# Patient Record
Sex: MALE | Race: WHITE | NOT HISPANIC OR LATINO
[De-identification: names, ages, dates, MRNs, and addresses within clinical notes are randomized per-mention and may not be internally consistent; named-entity substitution may affect disease eponyms.]

---

## 2017-01-05 ENCOUNTER — APPOINTMENT (OUTPATIENT)
Dept: PULMONOLOGY | Facility: CLINIC | Age: 52
End: 2017-01-05

## 2017-01-19 ENCOUNTER — RESULT REVIEW (OUTPATIENT)
Age: 52
End: 2017-01-19

## 2017-01-19 ENCOUNTER — FORM ENCOUNTER (OUTPATIENT)
Age: 52
End: 2017-01-19

## 2017-01-20 ENCOUNTER — OUTPATIENT (OUTPATIENT)
Dept: OUTPATIENT SERVICES | Facility: HOSPITAL | Age: 52
LOS: 1 days | Discharge: HOME | End: 2017-01-20
Payer: MEDICARE

## 2017-01-20 LAB
B PERT IGG+IGM PNL SER: SIGNIFICANT CHANGE UP
COLOR FLD: SIGNIFICANT CHANGE UP
FLUID INTAKE SUBSTANCE CLASS: SIGNIFICANT CHANGE UP
RCV VOL RI: 3560 /UL — HIGH (ref 0–5)
SPECIMEN SOURCE FLD: SIGNIFICANT CHANGE UP
TOTAL NUCLEATED CELL COUNT, BODY FLUID: 120 /UL — HIGH (ref 0–5)
TUBE TYPE: SIGNIFICANT CHANGE UP

## 2017-01-20 PROCEDURE — 31652 BRONCH EBUS SAMPLNG 1/2 NODE: CPT

## 2017-01-20 PROCEDURE — 31628 BRONCHOSCOPY/LUNG BX EACH: CPT

## 2017-01-20 PROCEDURE — 31629 BRONCHOSCOPY/NEEDLE BX EACH: CPT

## 2017-01-20 PROCEDURE — 31623 DX BRONCHOSCOPE/BRUSH: CPT

## 2017-01-20 PROCEDURE — 88305 TISSUE EXAM BY PATHOLOGIST: CPT

## 2017-01-20 PROCEDURE — 31654 BRONCH EBUS IVNTJ PERPH LES: CPT

## 2017-01-20 PROCEDURE — 31624 DX BRONCHOSCOPE/LAVAGE: CPT

## 2017-01-20 PROCEDURE — 87070 CULTURE OTHR SPECIMN AEROBIC: CPT

## 2017-01-20 PROCEDURE — 88104 CYTOPATH FL NONGYN SMEARS: CPT

## 2017-01-20 PROCEDURE — 87015 SPECIMEN INFECT AGNT CONCNTJ: CPT

## 2017-01-20 PROCEDURE — 87102 FUNGUS ISOLATION CULTURE: CPT

## 2017-01-20 PROCEDURE — 88173 CYTOPATH EVAL FNA REPORT: CPT

## 2017-01-20 PROCEDURE — 71010: CPT | Mod: 26

## 2017-01-20 PROCEDURE — 87206 SMEAR FLUORESCENT/ACID STAI: CPT

## 2017-01-20 PROCEDURE — 88341 IMHCHEM/IMCYTCHM EA ADD ANTB: CPT

## 2017-01-20 PROCEDURE — 89051 BODY FLUID CELL COUNT: CPT

## 2017-01-20 PROCEDURE — 71045 X-RAY EXAM CHEST 1 VIEW: CPT

## 2017-01-20 PROCEDURE — 88112 CYTOPATH CELL ENHANCE TECH: CPT

## 2017-01-20 PROCEDURE — 87116 MYCOBACTERIA CULTURE: CPT

## 2017-01-20 PROCEDURE — 87184 SC STD DISK METHOD PER PLATE: CPT

## 2017-01-21 LAB
FLUID SEGMENTED GRANULOCYTES: SIGNIFICANT CHANGE UP %
GRAM STN FLD: SIGNIFICANT CHANGE UP
NIGHT BLUE STAIN TISS: SIGNIFICANT CHANGE UP
SPECIMEN SOURCE: SIGNIFICANT CHANGE UP
SPECIMEN SOURCE: SIGNIFICANT CHANGE UP

## 2017-01-23 LAB
-  AMPICILLIN/SULBACTAM: SIGNIFICANT CHANGE UP
-  AMPICILLIN: SIGNIFICANT CHANGE UP
-  AZITHROMYCIN: SIGNIFICANT CHANGE UP
-  CEFTRIAXONE: SIGNIFICANT CHANGE UP
-  CHLORAMPHENICOL: SIGNIFICANT CHANGE UP
-  CIPROFLOXACIN: SIGNIFICANT CHANGE UP
-  CLARITHROMYCIN: SIGNIFICANT CHANGE UP
-  LEVOFLOXACIN: SIGNIFICANT CHANGE UP
-  TRIMETHOPRIM/SULFAMETHOXAZOLE: SIGNIFICANT CHANGE UP
CULTURE RESULTS: SIGNIFICANT CHANGE UP
METHOD TYPE: SIGNIFICANT CHANGE UP
ORGANISM # SPEC MICROSCOPIC CNT: SIGNIFICANT CHANGE UP
ORGANISM # SPEC MICROSCOPIC CNT: SIGNIFICANT CHANGE UP
SPECIMEN SOURCE: SIGNIFICANT CHANGE UP

## 2017-01-24 LAB
NON-GYNECOLOGICAL CYTOLOGY STUDY: SIGNIFICANT CHANGE UP
SURGICAL PATHOLOGY STUDY: SIGNIFICANT CHANGE UP

## 2017-01-26 DIAGNOSIS — R91.1 SOLITARY PULMONARY NODULE: ICD-10-CM

## 2017-01-30 PROBLEM — R93.3 GASTROINTESTINAL TRACT IMAGING ABNORMALITY: Status: ACTIVE | Noted: 2017-01-30

## 2017-01-31 ENCOUNTER — APPOINTMENT (OUTPATIENT)
Dept: THORACIC SURGERY | Facility: CLINIC | Age: 52
End: 2017-01-31

## 2017-01-31 DIAGNOSIS — F17.200 NICOTINE DEPENDENCE, UNSPECIFIED, UNCOMPLICATED: ICD-10-CM

## 2017-01-31 DIAGNOSIS — Z78.9 OTHER SPECIFIED HEALTH STATUS: ICD-10-CM

## 2017-01-31 DIAGNOSIS — Z80.1 FAMILY HISTORY OF MALIGNANT NEOPLASM OF TRACHEA, BRONCHUS AND LUNG: ICD-10-CM

## 2017-01-31 DIAGNOSIS — R93.3 ABNORMAL FINDINGS ON DIAGNOSTIC IMAGING OF OTHER PARTS OF DIGESTIVE TRACT: ICD-10-CM

## 2017-02-01 PROBLEM — Z78.9 SOCIAL ALCOHOL USE: Status: ACTIVE | Noted: 2017-02-01

## 2017-02-01 PROBLEM — F17.200 CURRENT EVERY DAY SMOKER: Status: ACTIVE | Noted: 2017-02-01

## 2017-02-01 PROBLEM — Z80.1 FAMILY HISTORY OF LUNG CANCER: Status: ACTIVE | Noted: 2017-02-01

## 2017-02-01 RX ORDER — GABAPENTIN 250 MG/5ML
250 SOLUTION ORAL
Refills: 0 | Status: COMPLETED | COMMUNITY
End: 2017-02-01

## 2017-02-01 RX ORDER — OXYCODONE HYDROCHLORIDE AND ACETAMINOPHEN 5; 325 MG/1; MG/1
5-325 TABLET ORAL
Refills: 0 | Status: ACTIVE | COMMUNITY

## 2017-02-16 ENCOUNTER — APPOINTMENT (OUTPATIENT)
Dept: PULMONOLOGY | Facility: CLINIC | Age: 52
End: 2017-02-16

## 2017-02-18 LAB
CULTURE RESULTS: SIGNIFICANT CHANGE UP
SPECIMEN SOURCE: SIGNIFICANT CHANGE UP

## 2017-02-21 ENCOUNTER — RESULT REVIEW (OUTPATIENT)
Age: 52
End: 2017-02-21

## 2017-02-21 VITALS
DIASTOLIC BLOOD PRESSURE: 99 MMHG | OXYGEN SATURATION: 94 % | HEIGHT: 70 IN | WEIGHT: 258.16 LBS | SYSTOLIC BLOOD PRESSURE: 115 MMHG | TEMPERATURE: 98 F | HEART RATE: 79 BPM | RESPIRATION RATE: 17 BRPM

## 2017-02-21 NOTE — PATIENT PROFILE ADULT. - PSH
H/O neck surgery    History of back surgery H/O neck surgery  cervical fusion  History of back surgery  lumbar fusion

## 2017-02-21 NOTE — PATIENT PROFILE ADULT. - PMH
COPD (chronic obstructive pulmonary disease)    Diabetes mellitus    Hypercholesterolemia    Lung nodule  RLL Chronic back pain    COPD (chronic obstructive pulmonary disease)    Diabetes mellitus    Hypercholesterolemia    Lung nodule  RLL

## 2017-02-22 ENCOUNTER — APPOINTMENT (OUTPATIENT)
Dept: THORACIC SURGERY | Facility: HOSPITAL | Age: 52
End: 2017-02-22

## 2017-02-22 ENCOUNTER — INPATIENT (INPATIENT)
Facility: HOSPITAL | Age: 52
LOS: 2 days | Discharge: HOME | DRG: 165 | End: 2017-02-25
Attending: SPECIALIST | Admitting: SPECIALIST
Payer: MEDICARE

## 2017-02-22 DIAGNOSIS — Z98.890 OTHER SPECIFIED POSTPROCEDURAL STATES: Chronic | ICD-10-CM

## 2017-02-22 LAB
ANION GAP SERPL CALC-SCNC: 8 MMOL/L — LOW (ref 9–16)
APTT BLD: 30.1 SEC — SIGNIFICANT CHANGE UP (ref 27.5–37.4)
BASE EXCESS BLDA CALC-SCNC: -1.4 MMOL/L — SIGNIFICANT CHANGE UP (ref -2–3)
BASE EXCESS BLDA CALC-SCNC: -2.6 MMOL/L — LOW (ref -2–3)
BUN SERPL-MCNC: 20 MG/DL — SIGNIFICANT CHANGE UP (ref 7–23)
CALCIUM SERPL-MCNC: 8.4 MG/DL — LOW (ref 8.5–10.5)
CHLORIDE SERPL-SCNC: 107 MMOL/L — SIGNIFICANT CHANGE UP (ref 96–108)
CK MB BLD-MCNC: 1.39 % — SIGNIFICANT CHANGE UP
CK MB CFR SERPL CALC: 12.8 NG/ML — HIGH (ref 0.5–3.6)
CK SERPL-CCNC: 923 U/L — HIGH (ref 39–308)
CO2 SERPL-SCNC: 23 MMOL/L — SIGNIFICANT CHANGE UP (ref 22–31)
CREAT SERPL-MCNC: 1.06 MG/DL — SIGNIFICANT CHANGE UP (ref 0.5–1.3)
EOSINOPHIL NFR BLD AUTO: 2 % — SIGNIFICANT CHANGE UP (ref 0–6)
GAS PNL BLDA: SIGNIFICANT CHANGE UP
GAS PNL BLDA: SIGNIFICANT CHANGE UP
GLUCOSE SERPL-MCNC: 168 MG/DL — HIGH (ref 70–99)
HBA1C BLD-MCNC: 6.1 % — HIGH (ref 4.8–5.6)
HCO3 BLDA-SCNC: 24 MMOL/L — SIGNIFICANT CHANGE UP (ref 21–28)
HCO3 BLDA-SCNC: 25 MMOL/L — SIGNIFICANT CHANGE UP (ref 21–28)
HCT VFR BLD CALC: 43.6 % — SIGNIFICANT CHANGE UP (ref 39–50)
HGB BLD-MCNC: 14.5 G/DL — SIGNIFICANT CHANGE UP (ref 13–17)
INR BLD: 0.95 — SIGNIFICANT CHANGE UP (ref 0.88–1.16)
LYMPHOCYTES # BLD AUTO: 7 % — LOW (ref 13–44)
MCHC RBC-ENTMCNC: 28.8 PG — SIGNIFICANT CHANGE UP (ref 27–34)
MCHC RBC-ENTMCNC: 33.3 G/DL — SIGNIFICANT CHANGE UP (ref 32–36)
MCV RBC AUTO: 86.5 FL — SIGNIFICANT CHANGE UP (ref 80–100)
MONOCYTES NFR BLD AUTO: 5 % — SIGNIFICANT CHANGE UP (ref 2–14)
NEUTROPHILS NFR BLD AUTO: 77 % — SIGNIFICANT CHANGE UP (ref 43–77)
PCO2 BLDA: 45 MMHG — SIGNIFICANT CHANGE UP (ref 35–48)
PCO2 BLDA: 48 MMHG — SIGNIFICANT CHANGE UP (ref 35–48)
PH BLDA: 7.33 — LOW (ref 7.35–7.45)
PH BLDA: 7.34 — LOW (ref 7.35–7.45)
PLATELET # BLD AUTO: 343 K/UL — SIGNIFICANT CHANGE UP (ref 150–400)
PO2 BLDA: 58 MMHG — CRITICAL LOW (ref 83–108)
PO2 BLDA: 68 MMHG — LOW (ref 83–108)
POTASSIUM SERPL-MCNC: 5 MMOL/L — SIGNIFICANT CHANGE UP (ref 3.5–5.3)
POTASSIUM SERPL-SCNC: 5 MMOL/L — SIGNIFICANT CHANGE UP (ref 3.5–5.3)
PROTHROM AB SERPL-ACNC: 10.5 SEC — SIGNIFICANT CHANGE UP (ref 10–13.1)
RBC # BLD: 5.04 M/UL — SIGNIFICANT CHANGE UP (ref 4.2–5.8)
RBC # FLD: 14.2 % — SIGNIFICANT CHANGE UP (ref 10.3–16.9)
SAO2 % BLDA: 90 % — LOW (ref 95–100)
SAO2 % BLDA: 92 % — LOW (ref 95–100)
SODIUM SERPL-SCNC: 138 MMOL/L — SIGNIFICANT CHANGE UP (ref 135–145)
TROPONIN I SERPL-MCNC: <0.015 NG/ML — SIGNIFICANT CHANGE UP (ref 0.01–0.04)
WBC # BLD: 21.1 K/UL — HIGH (ref 3.8–10.5)
WBC # FLD AUTO: 21.1 K/UL — HIGH (ref 3.8–10.5)

## 2017-02-22 PROCEDURE — 93010 ELECTROCARDIOGRAM REPORT: CPT

## 2017-02-22 PROCEDURE — 71010: CPT | Mod: 26

## 2017-02-22 RX ORDER — DEXTROSE 50 % IN WATER 50 %
25 SYRINGE (ML) INTRAVENOUS ONCE
Qty: 0 | Refills: 0 | Status: DISCONTINUED | OUTPATIENT
Start: 2017-02-22 | End: 2017-02-25

## 2017-02-22 RX ORDER — HYDROMORPHONE HYDROCHLORIDE 2 MG/ML
1.5 INJECTION INTRAMUSCULAR; INTRAVENOUS; SUBCUTANEOUS
Qty: 0 | Refills: 0 | Status: DISCONTINUED | OUTPATIENT
Start: 2017-02-22 | End: 2017-02-23

## 2017-02-22 RX ORDER — SODIUM CHLORIDE 9 MG/ML
1000 INJECTION, SOLUTION INTRAVENOUS
Qty: 0 | Refills: 0 | Status: DISCONTINUED | OUTPATIENT
Start: 2017-02-22 | End: 2017-02-25

## 2017-02-22 RX ORDER — BUPIVACAINE 13.3 MG/ML
20 INJECTION, SUSPENSION, LIPOSOMAL INFILTRATION ONCE
Qty: 0 | Refills: 0 | Status: DISCONTINUED | OUTPATIENT
Start: 2017-02-22 | End: 2017-02-24

## 2017-02-22 RX ORDER — SODIUM CHLORIDE 9 MG/ML
1000 INJECTION, SOLUTION INTRAVENOUS
Qty: 0 | Refills: 0 | Status: DISCONTINUED | OUTPATIENT
Start: 2017-02-22 | End: 2017-02-23

## 2017-02-22 RX ORDER — GLUCAGON INJECTION, SOLUTION 0.5 MG/.1ML
1 INJECTION, SOLUTION SUBCUTANEOUS ONCE
Qty: 0 | Refills: 0 | Status: DISCONTINUED | OUTPATIENT
Start: 2017-02-22 | End: 2017-02-25

## 2017-02-22 RX ORDER — FAMOTIDINE 10 MG/ML
20 INJECTION INTRAVENOUS EVERY 12 HOURS
Qty: 0 | Refills: 0 | Status: DISCONTINUED | OUTPATIENT
Start: 2017-02-22 | End: 2017-02-25

## 2017-02-22 RX ORDER — NICOTINE POLACRILEX 2 MG
1 GUM BUCCAL DAILY
Qty: 0 | Refills: 0 | Status: DISCONTINUED | OUTPATIENT
Start: 2017-02-22 | End: 2017-02-25

## 2017-02-22 RX ORDER — BUPROPION HYDROCHLORIDE 150 MG/1
150 TABLET, EXTENDED RELEASE ORAL DAILY
Qty: 0 | Refills: 0 | Status: DISCONTINUED | OUTPATIENT
Start: 2017-02-22 | End: 2017-02-25

## 2017-02-22 RX ORDER — NALOXONE HYDROCHLORIDE 4 MG/.1ML
0.1 SPRAY NASAL
Qty: 0 | Refills: 0 | Status: DISCONTINUED | OUTPATIENT
Start: 2017-02-22 | End: 2017-02-24

## 2017-02-22 RX ORDER — KETOROLAC TROMETHAMINE 30 MG/ML
30 SYRINGE (ML) INJECTION EVERY 6 HOURS
Qty: 0 | Refills: 0 | Status: DISCONTINUED | OUTPATIENT
Start: 2017-02-22 | End: 2017-02-25

## 2017-02-22 RX ORDER — DOCUSATE SODIUM 100 MG
100 CAPSULE ORAL THREE TIMES A DAY
Qty: 0 | Refills: 0 | Status: DISCONTINUED | OUTPATIENT
Start: 2017-02-22 | End: 2017-02-25

## 2017-02-22 RX ORDER — ONDANSETRON 8 MG/1
4 TABLET, FILM COATED ORAL EVERY 6 HOURS
Qty: 0 | Refills: 0 | Status: DISCONTINUED | OUTPATIENT
Start: 2017-02-22 | End: 2017-02-24

## 2017-02-22 RX ORDER — DEXTROSE 50 % IN WATER 50 %
12.5 SYRINGE (ML) INTRAVENOUS ONCE
Qty: 0 | Refills: 0 | Status: DISCONTINUED | OUTPATIENT
Start: 2017-02-22 | End: 2017-02-25

## 2017-02-22 RX ORDER — METHADONE HYDROCHLORIDE 40 MG/1
10 TABLET ORAL DAILY
Qty: 0 | Refills: 0 | Status: DISCONTINUED | OUTPATIENT
Start: 2017-02-22 | End: 2017-02-25

## 2017-02-22 RX ORDER — BUPROPION HYDROCHLORIDE 150 MG/1
150 TABLET, EXTENDED RELEASE ORAL DAILY
Qty: 0 | Refills: 0 | Status: DISCONTINUED | OUTPATIENT
Start: 2017-02-22 | End: 2017-02-22

## 2017-02-22 RX ORDER — GABAPENTIN 400 MG/1
300 CAPSULE ORAL
Qty: 0 | Refills: 0 | Status: DISCONTINUED | OUTPATIENT
Start: 2017-02-22 | End: 2017-02-25

## 2017-02-22 RX ORDER — METFORMIN HYDROCHLORIDE 850 MG/1
0 TABLET ORAL
Qty: 0 | Refills: 0 | COMMUNITY

## 2017-02-22 RX ORDER — ACETAMINOPHEN 500 MG
1000 TABLET ORAL ONCE
Qty: 0 | Refills: 0 | Status: DISCONTINUED | OUTPATIENT
Start: 2017-02-22 | End: 2017-02-25

## 2017-02-22 RX ORDER — INSULIN LISPRO 100/ML
VIAL (ML) SUBCUTANEOUS
Qty: 0 | Refills: 0 | Status: DISCONTINUED | OUTPATIENT
Start: 2017-02-22 | End: 2017-02-25

## 2017-02-22 RX ORDER — VANCOMYCIN HCL 1 G
1000 VIAL (EA) INTRAVENOUS EVERY 12 HOURS
Qty: 0 | Refills: 0 | Status: DISCONTINUED | OUTPATIENT
Start: 2017-02-22 | End: 2017-02-22

## 2017-02-22 RX ORDER — DEXTROSE 50 % IN WATER 50 %
1 SYRINGE (ML) INTRAVENOUS ONCE
Qty: 0 | Refills: 0 | Status: DISCONTINUED | OUTPATIENT
Start: 2017-02-22 | End: 2017-02-25

## 2017-02-22 RX ORDER — HEPARIN SODIUM 5000 [USP'U]/ML
7500 INJECTION INTRAVENOUS; SUBCUTANEOUS EVERY 8 HOURS
Qty: 0 | Refills: 0 | Status: DISCONTINUED | OUTPATIENT
Start: 2017-02-22 | End: 2017-02-25

## 2017-02-22 RX ORDER — CEFAZOLIN SODIUM 1 G
1000 VIAL (EA) INJECTION EVERY 8 HOURS
Qty: 0 | Refills: 0 | Status: COMPLETED | OUTPATIENT
Start: 2017-02-22 | End: 2017-02-24

## 2017-02-22 RX ORDER — HYDROMORPHONE HYDROCHLORIDE 2 MG/ML
30 INJECTION INTRAMUSCULAR; INTRAVENOUS; SUBCUTANEOUS
Qty: 0 | Refills: 0 | Status: DISCONTINUED | OUTPATIENT
Start: 2017-02-22 | End: 2017-02-23

## 2017-02-22 RX ORDER — HEPARIN SODIUM 5000 [USP'U]/ML
5000 INJECTION INTRAVENOUS; SUBCUTANEOUS EVERY 8 HOURS
Qty: 0 | Refills: 0 | Status: DISCONTINUED | OUTPATIENT
Start: 2017-02-22 | End: 2017-02-22

## 2017-02-22 RX ADMIN — Medication 1 PATCH: at 23:06

## 2017-02-22 RX ADMIN — ONDANSETRON 4 MILLIGRAM(S): 8 TABLET, FILM COATED ORAL at 22:51

## 2017-02-22 RX ADMIN — HEPARIN SODIUM 7500 UNIT(S): 5000 INJECTION INTRAVENOUS; SUBCUTANEOUS at 15:57

## 2017-02-22 RX ADMIN — GABAPENTIN 300 MILLIGRAM(S): 400 CAPSULE ORAL at 22:26

## 2017-02-22 RX ADMIN — Medication 100 MILLIGRAM(S): at 15:57

## 2017-02-22 RX ADMIN — Medication 100 MILLIGRAM(S): at 22:26

## 2017-02-22 RX ADMIN — FAMOTIDINE 20 MILLIGRAM(S): 10 INJECTION INTRAVENOUS at 22:26

## 2017-02-22 RX ADMIN — HEPARIN SODIUM 7500 UNIT(S): 5000 INJECTION INTRAVENOUS; SUBCUTANEOUS at 22:26

## 2017-02-22 RX ADMIN — HYDROMORPHONE HYDROCHLORIDE 30 MILLILITER(S): 2 INJECTION INTRAMUSCULAR; INTRAVENOUS; SUBCUTANEOUS at 20:11

## 2017-02-22 RX ADMIN — HYDROMORPHONE HYDROCHLORIDE 30 MILLILITER(S): 2 INJECTION INTRAMUSCULAR; INTRAVENOUS; SUBCUTANEOUS at 14:35
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
## 2017-02-22 NOTE — PROVIDER CONTACT NOTE (OTHER) - SITUATION
pt c/o non-radiating chest pain to right side of chest, PA page and made aware, pt denies any difficulty breathing or SOB

## 2017-02-22 NOTE — H&P ADULT. - PMH
Chronic back pain    COPD (chronic obstructive pulmonary disease)    Diabetes mellitus    Hypercholesterolemia    Lung nodule  RLL

## 2017-02-22 NOTE — CONSULT NOTE ADULT - SUBJECTIVE AND OBJECTIVE BOX
Pain Management Consult Note - [facility] [phone]    Chief Complaint:  Chest pain    HPI:  50 y/o male seen in PACU S/P VATS RLL lobectomy.  Pt. appears comfortable now.  Per RN pt. was given haldol as he was combative after coming out of the OR.  Pt. has chronic pain in his back and sees Dr. Aguilar for pain management.  He takes methadone 10 mg daily (states he stopped it 3 days ago due to nausea), percocet 10/325 6 tabs/day, and gabapentin 300 mg BID.  Pt. complains of pain in the chest.      Pain is ___ sharp ____dull ___burning ___achy ___ Intensity: ____ mild ___mod ___severe     Location __x__surgical site ____cervical _____lumbar ____abd ____upper ext____lower ext    Worse with ____activity _x___movement _____physical therapy___ Rest    Improved with ____medication __x__rest ____physical therapy    ROS: Const:  _-__febrile   Eyes:___ENT:___CV: _+__chest pain  Resp: __-__sob  GI:_-__nausea _-__vomiting _-__abd pain ___npo ___clears _+_full diet __bm  :___ Musk: _+__pain ___spasm  Skin:_+__ Neuro:  __-_nzriolnn__-_ushuzzqqi_+ (left leg-chronic)__ numbness __-__weakness ___paresth  Psych:__anxiety  Endo:_+__ Heme:__-_Allergy:__PCN_______, ___all others reviewed and negative    PAST MEDICAL & SURGICAL HISTORY:  Chronic back pain  Hypercholesterolemia  Diabetes mellitus  COPD (chronic obstructive pulmonary disease)  Lung nodule: RLL  H/O neck surgery: cervical fusion  History of back surgery: lumbar fusion      SH: _<1/2 ppd__Tobacco   _unknown__Alcohol                          FH:FAMILY HISTORY:  None per patient      lactated ringers. 1000milliLiter(s) IV Continuous <Continuous>  ketorolac   Injectable 30milliGRAM(s) IV Push every 6 hours PRN  famotidine    Tablet 20milliGRAM(s) Oral every 12 hours  docusate sodium 100milliGRAM(s) Oral three times a day  insulin lispro (HumaLOG) corrective regimen sliding scale  SubCutaneous Before meals and at bedtime  dextrose 5%. 1000milliLiter(s) IV Continuous <Continuous>  dextrose Gel 1Dose(s) Oral once PRN  dextrose 50% Injectable 12.5Gram(s) IV Push once  dextrose 50% Injectable 25Gram(s) IV Push once  dextrose 50% Injectable 25Gram(s) IV Push once  glucagon  Injectable 1milliGRAM(s) IntraMuscular once PRN  BUpivacaine liposome 1.3% Injectable 20milliLiter(s) Local Injection once  heparin  Injectable 7500Unit(s) SubCutaneous every 8 hours  ceFAZolin   IVPB 1000milliGRAM(s) IV Intermittent every 8 hours  acetaminophen  IVPB. 1000milliGRAM(s) IV Intermittent once  nicotine -  14 mG/24Hr(s) Patch 1patch Transdermal daily  gabapentin 300milliGRAM(s) Oral two times a day  buPROPion XL . 150milliGRAM(s) Oral daily  HYDROmorphone PCA (1 mG/mL) 30milliLiter(s) PCA Continuous PCA Continuous  HYDROmorphone PCA (1 mG/mL) Rescue Clinician Bolus 1.5milliGRAM(s) IV Push every 2 hours PRN  naloxone Injectable 0.1milliGRAM(s) IV Push every 3 minutes PRN  ondansetron Injectable 4milliGRAM(s) IV Push every 6 hours PRN      T(C): 36.3, Max: 36.7 (02-21 @ 16:33)  HR: 93 (79 - 108)  BP: 124/71 (113/78 - 140/90)  RR: 16 (12 - 30)  SpO2: 94% (91% - 98%)  Wt(kg): --    T(C): 36.3, Max: 36.7 (02-21 @ 16:33)  HR: 93 (79 - 108)  BP: 124/71 (113/78 - 140/90)  RR: 16 (12 - 30)  SpO2: 94% (91% - 98%)  Wt(kg): --    T(C): 36.3, Max: 36.7 (02-21 @ 16:33)  HR: 93 (79 - 108)  BP: 124/71 (113/78 - 140/90)  RR: 16 (12 - 30)  SpO2: 94% (91% - 98%)  Wt(kg): --    PHYSICAL EXAM:  Gen Appearance: _x__no acute distress _x__appropriate        Neuro: __x_SILT feet__x__ EOM Intact Psych: AAOX_3_, __x_mood/affect appropriate        Eyes: __x_conjunctiva WNL  _x____ Pupils equal and round        ENT: _x__ears and nose atraumatic_x__ Hearing grossly intact        Neck: ___trachea midline, no visible masses ___thyroid without palpable mass    Resp: __x_Nml WOB____No tactile fremitus ___clear to auscultation    Cardio: ___extremities free from edema ____pedal pulses palpable    GI/Abdomen: _x__soft ___x__ Nontender______Nondistended_____HSM    Lymphatic: ___no palpable nodes in neck  ___no palpable nodes calves and feet    Skin/Wound: ___Incision, _x__Dressing c/d/i (over right side of chest),   ____surrounding tissues soft,  __x_drain/chest tube present____    Muscular: EHL _5__/5  Gastrocnemius___/5    _x__absent clubbing/cyanosis      ASSESSMENT: This is a 51y old Male with a history of   NODULE R91.1: NODULE R91.1  Chronic back pain  Hypercholesterolemia  Diabetes mellitus  COPD (chronic obstructive pulmonary disease)  Lung nodule: RLL  Right lower lobe lung mass  Robot-assisted lobectomy of right lung with video-assisted thoracoscopic surgery (VATS)  H/O neck surgery: cervical fusion  History of back surgery: lumbar fusion  opioid dependence and right sided chest pain S/P VATS, RLL lobectomy and is doing well post op.    Recommended Treatment PLAN:  1.  Methadone 10 mg qd  2.  Dilaudid PCA 0/0.4mg/6min with 1.5 mg IV q2h prn  3.  Toradol 30 mg IV q6h x6 doses prn as per primary team
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
## 2017-02-22 NOTE — H&P ADULT. - ASSESSMENT
52 y/o M with non-small cell CA of right lower lobe of lung  -consented for bronchoscopy with R VATS RA RLLx  -type and screen x 2  -2u PRBC on hold for OR

## 2017-02-22 NOTE — CHART NOTE - NSCHARTNOTEFT_GEN_A_CORE
Called from PACU that patient was having chest pain/pressure. Went and evaluated patient in PACU, patient complaining of right sided chest pain that is pressure in nature. Worse when he moves, coughs or takes a deep breath. Denies any radiation of the pain, shortness of breath, diaphoresis, nausea or vomiting. Patient states pain is 8/10. Patient on Dilaudid PCA, received 0.4mg via PCA and pain subsided to 4/10. EKG done in PACU, no obvious changes compared to preop EKG. Cardiac enzymes drawn, troponin negative. Will recheck again in 6 hours (10pm). Patient has right sided pleural chest tube in place, on suction with no air leak. Patient reassured that is was most likely post operative/ chest tube pain, pain fully resolved.

## 2017-02-22 NOTE — BRIEF OPERATIVE NOTE - PROCEDURE
Robot-assisted lobectomy of right lung with video-assisted thoracoscopic surgery (VATS)  02/22/2017    Active  DGLASSER

## 2017-02-23 LAB
ANION GAP SERPL CALC-SCNC: 9 MMOL/L — SIGNIFICANT CHANGE UP (ref 9–16)
BUN SERPL-MCNC: 13 MG/DL — SIGNIFICANT CHANGE UP (ref 7–23)
CALCIUM SERPL-MCNC: 8.6 MG/DL — SIGNIFICANT CHANGE UP (ref 8.5–10.5)
CHLORIDE SERPL-SCNC: 101 MMOL/L — SIGNIFICANT CHANGE UP (ref 96–108)
CK MB BLD-MCNC: 1.22 % — SIGNIFICANT CHANGE UP
CK MB CFR SERPL CALC: 10.9 NG/ML — HIGH (ref 0.5–3.6)
CK SERPL-CCNC: 897 U/L — HIGH (ref 39–308)
CO2 SERPL-SCNC: 27 MMOL/L — SIGNIFICANT CHANGE UP (ref 22–31)
CREAT SERPL-MCNC: 0.68 MG/DL — SIGNIFICANT CHANGE UP (ref 0.5–1.3)
GLUCOSE SERPL-MCNC: 123 MG/DL — HIGH (ref 70–99)
HCT VFR BLD CALC: 39.5 % — SIGNIFICANT CHANGE UP (ref 39–50)
HGB BLD-MCNC: 12.9 G/DL — LOW (ref 13–17)
MAGNESIUM SERPL-MCNC: 2.1 MG/DL — SIGNIFICANT CHANGE UP (ref 1.6–2.4)
MCHC RBC-ENTMCNC: 28.6 PG — SIGNIFICANT CHANGE UP (ref 27–34)
MCHC RBC-ENTMCNC: 32.7 G/DL — SIGNIFICANT CHANGE UP (ref 32–36)
MCV RBC AUTO: 87.6 FL — SIGNIFICANT CHANGE UP (ref 80–100)
PHOSPHATE SERPL-MCNC: 3 MG/DL — SIGNIFICANT CHANGE UP (ref 2.5–4.5)
PLATELET # BLD AUTO: 311 K/UL — SIGNIFICANT CHANGE UP (ref 150–400)
POTASSIUM SERPL-MCNC: 3.9 MMOL/L — SIGNIFICANT CHANGE UP (ref 3.5–5.3)
POTASSIUM SERPL-SCNC: 3.9 MMOL/L — SIGNIFICANT CHANGE UP (ref 3.5–5.3)
RBC # BLD: 4.51 M/UL — SIGNIFICANT CHANGE UP (ref 4.2–5.8)
RBC # FLD: 14.1 % — SIGNIFICANT CHANGE UP (ref 10.3–16.9)
SODIUM SERPL-SCNC: 137 MMOL/L — SIGNIFICANT CHANGE UP (ref 135–145)
TROPONIN I SERPL-MCNC: <0.015 NG/ML — SIGNIFICANT CHANGE UP (ref 0.01–0.04)
WBC # BLD: 16.8 K/UL — HIGH (ref 3.8–10.5)
WBC # FLD AUTO: 16.8 K/UL — HIGH (ref 3.8–10.5)

## 2017-02-23 PROCEDURE — 71010: CPT | Mod: 26,77

## 2017-02-23 PROCEDURE — 71010: CPT | Mod: 26

## 2017-02-23 RX ADMIN — Medication 1 PATCH: at 13:49

## 2017-02-23 RX ADMIN — METHADONE HYDROCHLORIDE 10 MILLIGRAM(S): 40 TABLET ORAL at 13:37

## 2017-02-23 RX ADMIN — Medication 30 MILLIGRAM(S): at 07:14

## 2017-02-23 RX ADMIN — GABAPENTIN 300 MILLIGRAM(S): 400 CAPSULE ORAL at 06:17

## 2017-02-23 RX ADMIN — Medication 100 MILLIGRAM(S): at 07:41

## 2017-02-23 RX ADMIN — Medication 30 MILLIGRAM(S): at 05:50

## 2017-02-23 RX ADMIN — FAMOTIDINE 20 MILLIGRAM(S): 10 INJECTION INTRAVENOUS at 17:06

## 2017-02-23 RX ADMIN — Medication 100 MILLIGRAM(S): at 06:17

## 2017-02-23 RX ADMIN — HEPARIN SODIUM 7500 UNIT(S): 5000 INJECTION INTRAVENOUS; SUBCUTANEOUS at 22:04

## 2017-02-23 RX ADMIN — HEPARIN SODIUM 7500 UNIT(S): 5000 INJECTION INTRAVENOUS; SUBCUTANEOUS at 06:16

## 2017-02-23 RX ADMIN — Medication 100 MILLIGRAM(S): at 22:04

## 2017-02-23 RX ADMIN — Medication 100 MILLIGRAM(S): at 13:37

## 2017-02-23 RX ADMIN — Medication 100 MILLIGRAM(S): at 17:05

## 2017-02-23 RX ADMIN — FAMOTIDINE 20 MILLIGRAM(S): 10 INJECTION INTRAVENOUS at 06:17

## 2017-02-23 RX ADMIN — BUPROPION HYDROCHLORIDE 150 MILLIGRAM(S): 150 TABLET, EXTENDED RELEASE ORAL at 13:37

## 2017-02-23 RX ADMIN — Medication 100 MILLIGRAM(S): at 00:35

## 2017-02-23 RX ADMIN — GABAPENTIN 300 MILLIGRAM(S): 400 CAPSULE ORAL at 17:06

## 2017-02-23 RX ADMIN — HEPARIN SODIUM 7500 UNIT(S): 5000 INJECTION INTRAVENOUS; SUBCUTANEOUS at 14:00

## 2017-02-23 RX ADMIN — HYDROMORPHONE HYDROCHLORIDE 30 MILLILITER(S): 2 INJECTION INTRAMUSCULAR; INTRAVENOUS; SUBCUTANEOUS at 09:17

## 2017-02-23 RX ADMIN — Medication 1 PATCH: at 23:45

## 2017-02-23 NOTE — CHART NOTE - NSCHARTNOTEFT_GEN_A_CORE
Chest Tube Removal:    Patient states that he has severe pain where the chest tube is. Tube checked for airleak. There was no air leak. Chest Xray reviewed without evidence of pneumo or airspace. Chest tube site was cleaned thoroughly. Instructions for chest tube removal gone over thoroughly and practiced multiple times. Tube was removed with occulsive dressing in place and tie down was tied. Patient reported feeling better after chest tube removal. Chest xray was reviewed without evidence of airspace. Subcutaneous air stable..      Alice Shah Pa-C

## 2017-02-23 NOTE — PROGRESS NOTE ADULT - SUBJECTIVE AND OBJECTIVE BOX
Patient discussed on morning rounds with Dr. Roberts     Operation / Date:   2/22/17 - R VATS RA RLLx with lymph node dissection    SUBJECTIVE ASSESSMENT:  51y Male s/p chest tube removal. States that his pain has only improved slightly since the chest tube was removed. States that he is still pressing his PCA every few minutes. Denies SOB, left sided chest pain, n/v/d, abdominal pain, leg swelling or leg tenderness, dizziness or lightheadedness.         Vital Signs Last 24 Hrs  T(C): 36.9, Max: 37.3 (02-22 @ 21:15)  T(F): 98.4, Max: 99.1 (02-22 @ 21:15)  HR: 84 (82 - 105)  BP: 109/75 (109/75 - 144/87)  BP(mean): 87 (87 - 124)  RR: 16 (12 - 20)  SpO2: 97% (90% - 99%)  I&O's Detail    I & Os for current day (as of 23 Feb 2017 12:06)  =============================================  IN:    lactated ringers.: 900 ml    Oral Fluid: 100 ml    Total IN: 1000 ml  ---------------------------------------------  OUT:    Voided: 600 ml    Chest Tube: 31 ml    Total OUT: 631 ml  ---------------------------------------------  Total NET: 369 ml      CHEST TUBE:  No.  MELISSA DRAIN:  No.  EPICARDIAL WIRES: No.  TIE DOWNS: Yes.  MOORE: No.    PHYSICAL EXAM:    General: AOx3 in distress due to pain     Neurological: No focal neuro deficit. Distal sensation intact.     Cardiovascular:    Respiratory:    Gastrointestinal:    Extremities:    Vascular:    Incision Sites:    LABS:                        12.9   16.8  )-----------( 311      ( 23 Feb 2017 10:45 )             39.5       COUMADIN:  Yes/No. REASON: .    PT/INR - ( 22 Feb 2017 14:03 )   PT: 10.5 sec;   INR: 0.95          PTT - ( 22 Feb 2017 14:03 )  PTT:30.1 sec    23 Feb 2017 04:49    137    |  101    |  13     ----------------------------<  123    3.9     |  27     |  0.68     Ca    8.6        23 Feb 2017 04:49  Phos  3.0       23 Feb 2017 04:49  Mg     2.1       23 Feb 2017 04:49            MEDICATIONS  (STANDING):  lactated ringers. 1000milliLiter(s) IV Continuous <Continuous>  famotidine    Tablet 20milliGRAM(s) Oral every 12 hours  docusate sodium 100milliGRAM(s) Oral three times a day  insulin lispro (HumaLOG) corrective regimen sliding scale  SubCutaneous Before meals and at bedtime  dextrose 5%. 1000milliLiter(s) IV Continuous <Continuous>  dextrose 50% Injectable 12.5Gram(s) IV Push once  dextrose 50% Injectable 25Gram(s) IV Push once  dextrose 50% Injectable 25Gram(s) IV Push once  BUpivacaine liposome 1.3% Injectable 20milliLiter(s) Local Injection once  heparin  Injectable 7500Unit(s) SubCutaneous every 8 hours  ceFAZolin   IVPB 1000milliGRAM(s) IV Intermittent every 8 hours  acetaminophen  IVPB. 1000milliGRAM(s) IV Intermittent once  nicotine -  14 mG/24Hr(s) Patch 1patch Transdermal daily  gabapentin 300milliGRAM(s) Oral two times a day  buPROPion XL . 150milliGRAM(s) Oral daily  HYDROmorphone PCA (1 mG/mL) 30milliLiter(s) PCA Continuous PCA Continuous  methadone 10milliGRAM(s) Oral daily    MEDICATIONS  (PRN):  ketorolac   Injectable 30milliGRAM(s) IV Push every 6 hours PRN Moderate Pain  dextrose Gel 1Dose(s) Oral once PRN Blood Glucose LESS THAN 70 milliGRAM(s)/deciliter  glucagon  Injectable 1milliGRAM(s) IntraMuscular once PRN Glucose LESS THAN 70 milligrams/deciliter  HYDROmorphone PCA (1 mG/mL) Rescue Clinician Bolus 1.5milliGRAM(s) IV Push every 2 hours PRN for Pain Scale GREATER THAN 6  naloxone Injectable 0.1milliGRAM(s) IV Push every 3 minutes PRN For ANY of the following changes in patient status:  A. RR LESS THAN 10 breaths per minute, B. Oxygen saturation LESS THAN 90%, C. Sedation score of 6  ondansetron Injectable 4milliGRAM(s) IV Push every 6 hours PRN Nausea        RADIOLOGY & ADDITIONAL TESTS: Patient discussed on morning rounds with Dr. Roberts     Operation / Date:   2/22/17 - R VATS RA RLLx with lymph node dissection    SUBJECTIVE ASSESSMENT:  51y Male s/p chest tube removal. States that his pain has only improved slightly since the chest tube was removed. States that he is still pressing his PCA every few minutes. Denies SOB, left sided chest pain, n/v/d, abdominal pain, leg swelling or leg tenderness, dizziness or lightheadedness.         Vital Signs Last 24 Hrs  T(C): 36.9, Max: 37.3 (02-22 @ 21:15)  T(F): 98.4, Max: 99.1 (02-22 @ 21:15)  HR: 84 (82 - 105)  BP: 109/75 (109/75 - 144/87)  BP(mean): 87 (87 - 124)  RR: 16 (12 - 20)  SpO2: 97% (90% - 99%)  I&O's Detail    I & Os for current day (as of 23 Feb 2017 12:06)  =============================================  IN:    lactated ringers.: 900 ml    Oral Fluid: 100 ml    Total IN: 1000 ml  ---------------------------------------------  OUT:    Voided: 600 ml    Chest Tube: 31 ml    Total OUT: 631 ml  ---------------------------------------------  Total NET: 369 ml      CHEST TUBE:  No.  MELSISA DRAIN:  No.  EPICARDIAL WIRES: No.  TIE DOWNS: Yes.  MOORE: No.    PHYSICAL EXAM:    General: AOx3 in distress due to pain. Answers questions appropriately.     Neurological: No focal neuro deficit. Distal sensation intact. good nose to finger coordination. EOMS intact. +pupil constricted.     Cardiovascular: RRR no murmurs rubs or gallops.     Respiratory: Right lung with minor wheeze. Decreased Lung sounds at the right base. Poor inspiratory effort. Left lung CTA.     Gastrointestinal: Soft non tender non distended.     Extremities: WWP no peripheral edema.      Vascular: 2+ distal pulses bilaterally.     Incision Sites: Right sided chest tube site with tie down and occlusive dressing in place.     LABS:                        12.9   16.8  )-----------( 311      ( 23 Feb 2017 10:45 )             39.5       COUMADIN:/No. .    PT/INR - ( 22 Feb 2017 14:03 )   PT: 10.5 sec;   INR: 0.95          PTT - ( 22 Feb 2017 14:03 )  PTT:30.1 sec    23 Feb 2017 04:49    137    |  101    |  13     ----------------------------<  123    3.9     |  27     |  0.68     Ca    8.6        23 Feb 2017 04:49  Phos  3.0       23 Feb 2017 04:49  Mg     2.1       23 Feb 2017 04:49            MEDICATIONS  (STANDING):  lactated ringers. 1000milliLiter(s) IV Continuous <Continuous>  famotidine    Tablet 20milliGRAM(s) Oral every 12 hours  docusate sodium 100milliGRAM(s) Oral three times a day  insulin lispro (HumaLOG) corrective regimen sliding scale  SubCutaneous Before meals and at bedtime  dextrose 5%. 1000milliLiter(s) IV Continuous <Continuous>  dextrose 50% Injectable 12.5Gram(s) IV Push once  dextrose 50% Injectable 25Gram(s) IV Push once  dextrose 50% Injectable 25Gram(s) IV Push once  BUpivacaine liposome 1.3% Injectable 20milliLiter(s) Local Injection once  heparin  Injectable 7500Unit(s) SubCutaneous every 8 hours  ceFAZolin   IVPB 1000milliGRAM(s) IV Intermittent every 8 hours  acetaminophen  IVPB. 1000milliGRAM(s) IV Intermittent once  nicotine -  14 mG/24Hr(s) Patch 1patch Transdermal daily  gabapentin 300milliGRAM(s) Oral two times a day  buPROPion XL . 150milliGRAM(s) Oral daily  HYDROmorphone PCA (1 mG/mL) 30milliLiter(s) PCA Continuous PCA Continuous  methadone 10milliGRAM(s) Oral daily    MEDICATIONS  (PRN):  ketorolac   Injectable 30milliGRAM(s) IV Push every 6 hours PRN Moderate Pain  dextrose Gel 1Dose(s) Oral once PRN Blood Glucose LESS THAN 70 milliGRAM(s)/deciliter  glucagon  Injectable 1milliGRAM(s) IntraMuscular once PRN Glucose LESS THAN 70 milligrams/deciliter  HYDROmorphone PCA (1 mG/mL) Rescue Clinician Bolus 1.5milliGRAM(s) IV Push every 2 hours PRN for Pain Scale GREATER THAN 6  naloxone Injectable 0.1milliGRAM(s) IV Push every 3 minutes PRN For ANY of the following changes in patient status:  A. RR LESS THAN 10 breaths per minute, B. Oxygen saturation LESS THAN 90%, C. Sedation score of 6  ondansetron Injectable 4milliGRAM(s) IV Push every 6 hours PRN Nausea        RADIOLOGY & ADDITIONAL TESTS:

## 2017-02-23 NOTE — PROGRESS NOTE ADULT - SUBJECTIVE AND OBJECTIVE BOX
Pain Management Progress Note - [facility] [phone]        HPI:  Pt seen today.  Pt s/p VATS RLL lobectomy. Pain controlled   Pt. has chronic pain in his back and sees Dr. Aguilar for pain management.  Pt. complains of pain in the chest worse with movement.      Pain is ___ sharp __x__dull ___burning ___achy ___ Intensity: __x__ mild _x__mod ___severe     Location __x__surgical site ____cervical _____lumbar ____abd ____upper ext____lower ext    Worse with ____activity _x___movement _____physical therapy___ Rest    Improved with __x__medication __x__rest ____physical therapy    ROS: Const:  _-__febrile   Eyes:___ENT:___CV: _+__chest pain  Resp: __-__sob  GI:_-__nausea _-__vomiting _-__abd pain ___npo ___clears _+_full diet __bm  :___ Musk: _+__pain ___spasm  Skin:_+__ Neuro:  __-_havtqirf__-_efzppfljq_+ (left leg-chronic)__ numbness __-__weakness ___paresth  Psych:__anxiety  Endo:_+__ Heme:__-_Allergy:__PCN    PAST MEDICAL & SURGICAL HISTORY:  Chronic back pain  Hypercholesterolemia  Diabetes mellitus  COPD (chronic obstructive pulmonary disease)  Lung nodule: RLL  H/O neck surgery: cervical fusion  History of back surgery: lumbar fusion          Vital Signs Last 24 Hrs  T(C): 37.1, Max: 37.3 (02-22 @ 21:15)  T(F): 98.7, Max: 99.1 (02-22 @ 21:15)  HR: 90 (82 - 108)  BP: 133/75 (113/78 - 144/87)  BP(mean): 95 (89 - 124)  RR: 16 (12 - 30)  SpO2: 97% (90% - 99%)    PHYSICAL EXAM:  Gen Appearance: _x__no acute distress _x__appropriate        Neuro: __x_SILT feet__x__ EOM Intact Psych: AAOX_3_, __x_mood/affect appropriate        Eyes: __x_conjunctiva WNL  _x____ Pupils equal and round        ENT: _x__ears and nose atraumatic_x__ Hearing grossly intact        Neck: ___trachea midline, no visible masses ___thyroid without palpable mass    Resp: __x_Nml WOB____No tactile fremitus ___clear to auscultation    Cardio: ___extremities free from edema ____pedal pulses palpable    GI/Abdomen: _x__soft ___x__ Nontender______Nondistended_____HSM    Lymphatic: ___no palpable nodes in neck  ___no palpable nodes calves and feet    Skin/Wound: ___Incision, _x__Dressing c/d/i (over right side of chest),   ____surrounding tissues soft,  __x_drain/chest tube present____    Muscular: EHL _5__/5  Gastrocnemius___/5    _x__absent clubbing/cyanosis      ASSESSMENT: This is a 51y old Male with a history of   NODULE R91.1: NODULE R91.1  Chronic back pain  COPD (chronic obstructive pulmonary disease)  Lung nodule: RLL  Right lower lobe lung mass  Robot-assisted lobectomy of right lung with video-assisted thoracoscopic surgery (VATS)  H/O neck surgery: cervical fusion  History of back surgery: lumbar fusion  opioid dependence and right sided chest pain S/P VATS, RLL lobectomy and is doing well post op.    Recommended Treatment PLAN:  1.  Methadone 10 mg qd  2.  Continue Dilaudid PCA 0/0.4mg/6min with 1.5 mg IV q2h prn  3.  Toradol 30 mg IV q6h x6 doses prn as per primary team
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
## 2017-02-24 LAB
ANION GAP SERPL CALC-SCNC: 4 MMOL/L — LOW (ref 9–16)
BUN SERPL-MCNC: 16 MG/DL — SIGNIFICANT CHANGE UP (ref 7–23)
CALCIUM SERPL-MCNC: 8.7 MG/DL — SIGNIFICANT CHANGE UP (ref 8.5–10.5)
CHLORIDE SERPL-SCNC: 100 MMOL/L — SIGNIFICANT CHANGE UP (ref 96–108)
CO2 SERPL-SCNC: 32 MMOL/L — HIGH (ref 22–31)
CREAT SERPL-MCNC: 0.82 MG/DL — SIGNIFICANT CHANGE UP (ref 0.5–1.3)
GLUCOSE SERPL-MCNC: 106 MG/DL — HIGH (ref 70–99)
HCT VFR BLD CALC: 39 % — SIGNIFICANT CHANGE UP (ref 39–50)
HGB BLD-MCNC: 12.7 G/DL — LOW (ref 13–17)
MAGNESIUM SERPL-MCNC: 2.2 MG/DL — SIGNIFICANT CHANGE UP (ref 1.6–2.4)
MCHC RBC-ENTMCNC: 28.9 PG — SIGNIFICANT CHANGE UP (ref 27–34)
MCHC RBC-ENTMCNC: 32.6 G/DL — SIGNIFICANT CHANGE UP (ref 32–36)
MCV RBC AUTO: 88.8 FL — SIGNIFICANT CHANGE UP (ref 80–100)
PHOSPHATE SERPL-MCNC: 2.1 MG/DL — LOW (ref 2.5–4.5)
PLATELET # BLD AUTO: 303 K/UL — SIGNIFICANT CHANGE UP (ref 150–400)
POTASSIUM SERPL-MCNC: 4.1 MMOL/L — SIGNIFICANT CHANGE UP (ref 3.5–5.3)
POTASSIUM SERPL-SCNC: 4.1 MMOL/L — SIGNIFICANT CHANGE UP (ref 3.5–5.3)
RBC # BLD: 4.39 M/UL — SIGNIFICANT CHANGE UP (ref 4.2–5.8)
RBC # FLD: 14.1 % — SIGNIFICANT CHANGE UP (ref 10.3–16.9)
SODIUM SERPL-SCNC: 136 MMOL/L — SIGNIFICANT CHANGE UP (ref 135–145)
SURGICAL PATHOLOGY STUDY: SIGNIFICANT CHANGE UP
WBC # BLD: 15 K/UL — HIGH (ref 3.8–10.5)
WBC # FLD AUTO: 15 K/UL — HIGH (ref 3.8–10.5)

## 2017-02-24 PROCEDURE — 71010: CPT | Mod: 26

## 2017-02-24 RX ORDER — POLYETHYLENE GLYCOL 3350 17 G/17G
17 POWDER, FOR SOLUTION ORAL DAILY
Qty: 0 | Refills: 0 | Status: DISCONTINUED | OUTPATIENT
Start: 2017-02-24 | End: 2017-02-25

## 2017-02-24 RX ORDER — LEVALBUTEROL 1.25 MG/.5ML
0.63 SOLUTION, CONCENTRATE RESPIRATORY (INHALATION) EVERY 6 HOURS
Qty: 0 | Refills: 0 | Status: COMPLETED | OUTPATIENT
Start: 2017-02-24 | End: 2017-02-25

## 2017-02-24 RX ORDER — ACETYLCYSTEINE 200 MG/ML
10 VIAL (ML) MISCELLANEOUS DAILY
Qty: 0 | Refills: 0 | Status: DISCONTINUED | OUTPATIENT
Start: 2017-02-24 | End: 2017-02-25

## 2017-02-24 RX ORDER — METHADONE HYDROCHLORIDE 40 MG/1
1 TABLET ORAL
Qty: 7 | Refills: 0 | OUTPATIENT
Start: 2017-02-24 | End: 2017-03-03

## 2017-02-24 RX ORDER — SENNA PLUS 8.6 MG/1
2 TABLET ORAL AT BEDTIME
Qty: 0 | Refills: 0 | Status: DISCONTINUED | OUTPATIENT
Start: 2017-02-24 | End: 2017-02-25

## 2017-02-24 RX ORDER — FLUTICASONE PROPIONATE 50 MCG
1 SPRAY, SUSPENSION NASAL
Qty: 0 | Refills: 0 | Status: DISCONTINUED | OUTPATIENT
Start: 2017-02-24 | End: 2017-02-25

## 2017-02-24 RX ORDER — BUDESONIDE, MICRONIZED 100 %
0.25 POWDER (GRAM) MISCELLANEOUS EVERY 12 HOURS
Qty: 0 | Refills: 0 | Status: DISCONTINUED | OUTPATIENT
Start: 2017-02-24 | End: 2017-02-25

## 2017-02-24 RX ADMIN — Medication 100 MILLIGRAM(S): at 06:31

## 2017-02-24 RX ADMIN — Medication 1 PATCH: at 11:19

## 2017-02-24 RX ADMIN — GABAPENTIN 300 MILLIGRAM(S): 400 CAPSULE ORAL at 06:32

## 2017-02-24 RX ADMIN — HEPARIN SODIUM 7500 UNIT(S): 5000 INJECTION INTRAVENOUS; SUBCUTANEOUS at 21:11

## 2017-02-24 RX ADMIN — LEVALBUTEROL 0.63 MILLIGRAM(S): 1.25 SOLUTION, CONCENTRATE RESPIRATORY (INHALATION) at 11:19

## 2017-02-24 RX ADMIN — Medication 0.25 MILLIGRAM(S): at 18:38

## 2017-02-24 RX ADMIN — LEVALBUTEROL 0.63 MILLIGRAM(S): 1.25 SOLUTION, CONCENTRATE RESPIRATORY (INHALATION) at 18:26

## 2017-02-24 RX ADMIN — METHADONE HYDROCHLORIDE 10 MILLIGRAM(S): 40 TABLET ORAL at 11:19

## 2017-02-24 RX ADMIN — Medication 0.25 MILLIGRAM(S): at 10:15

## 2017-02-24 RX ADMIN — Medication 100 MILLIGRAM(S): at 21:11

## 2017-02-24 RX ADMIN — Medication 30 MILLIGRAM(S): at 03:12

## 2017-02-24 RX ADMIN — Medication 100 MILLIGRAM(S): at 13:51

## 2017-02-24 RX ADMIN — HEPARIN SODIUM 7500 UNIT(S): 5000 INJECTION INTRAVENOUS; SUBCUTANEOUS at 06:32

## 2017-02-24 RX ADMIN — Medication 10 MILLILITER(S): at 18:45

## 2017-02-24 RX ADMIN — SENNA PLUS 2 TABLET(S): 8.6 TABLET ORAL at 21:11

## 2017-02-24 RX ADMIN — FAMOTIDINE 20 MILLIGRAM(S): 10 INJECTION INTRAVENOUS at 06:32

## 2017-02-24 RX ADMIN — FAMOTIDINE 20 MILLIGRAM(S): 10 INJECTION INTRAVENOUS at 18:26

## 2017-02-24 RX ADMIN — Medication 100 MILLIGRAM(S): at 00:00

## 2017-02-24 RX ADMIN — Medication 1 PATCH: at 11:26

## 2017-02-24 RX ADMIN — BUPROPION HYDROCHLORIDE 150 MILLIGRAM(S): 150 TABLET, EXTENDED RELEASE ORAL at 11:19

## 2017-02-24 RX ADMIN — Medication 1 SPRAY(S): at 13:51

## 2017-02-24 RX ADMIN — GABAPENTIN 300 MILLIGRAM(S): 400 CAPSULE ORAL at 18:26

## 2017-02-24 RX ADMIN — Medication 30 MILLIGRAM(S): at 03:32

## 2017-02-24 RX ADMIN — HEPARIN SODIUM 7500 UNIT(S): 5000 INJECTION INTRAVENOUS; SUBCUTANEOUS at 13:52

## 2017-02-24 NOTE — PROGRESS NOTE ADULT - SUBJECTIVE AND OBJECTIVE BOX
Pt. seen at 1018  Pain Management Progress Note - [facility] [phone]    HPI:  Pt. states his pain is controlled on the current regimen.  PCA was D/C'd by the primary team and pt. is now taking Percocet 5/325 2 tabs prn and states it is helpful.              Pertinent PMH: Pain at: ___Back ___Neck___Knee ___Hip ___Shoulder _x__ Opioid tolerance    Pain is ___ sharp ____dull ___burning ___achy ___ Intensity: ____ mild ____mod ____severe     Location __x___surgical site _____cervical _____lumbar ____abd _____upper ext____lower ext    Worse with __x__activity ___x_movement _____physical therapy___ Rest    Improved with _x___medication ___x_rest ____physical therapy      ketorolac   Injectable  famotidine    Tablet  docusate sodium  insulin lispro (HumaLOG) corrective regimen sliding scale  dextrose 5%.  dextrose Gel  dextrose 50% Injectable  dextrose 50% Injectable  dextrose 50% Injectable  glucagon  Injectable  heparin  Injectable  acetaminophen  IVPB.  nicotine -  14 mG/24Hr(s) Patch  gabapentin  buPROPion XL .  methadone  (  oxyCODONE  5 mG/acetaminophen 325 mG  oxyCODONE  5 mG/acetaminophen 325 mG  buDESOnide   0.25 milliGRAM(s) Respule  levalbuterol Inhalation      ROS: Const:  ___febrile   Eyes:___ENT:___CV: _+__chest pain  Resp: __+__sob  GI:__-_nausea __-_vomiting ____abd pain ___npo ___clears _+__full diet __bm  :___ Musk: _+__pain ___spasm  Skin:___ Neuro:  _-__sgjocptf_-__fawnpkwvk__-__ numbness _-__weakness ___paresthesia  Psych:___anxiety  Endo:___ Heme:___Allergy:___      02-24 @ 06:45237 mL/min/1.73M2          Hemoglobin: 12.7 g/dL (02-24 @ 06:34)  Hemoglobin: 12.9 g/dL (02-23 @ 10:45)        T(C): 36.1, Max: 36.7 (02-23 @ 14:33)  HR: 112 (86 - 112)  BP: 111/77 (108/65 - 127/80)  RR: 22 (16 - 22)  SpO2: 92% (92% - 97%)  Wt(kg): --     PHYSICAL EXAM:  Gen Appearance: _x__no acute distress _x__appropriate         Neuro: _x__SILT feet__x__ EOM Intact Psych: AAOX_3_, _x__mood/affect appropriate        Eyes: __x_conjunctiva WNL  ___x__ Pupils equal and round        ENT: _x__ears and nose atraumatic__x_ Hearing grossly intact        Neck: ___trachea midline, no visible masses ___thyroid without palpable mass    Resp: _x__Nml WOB____No tactile fremitus ___clear to auscultation    Cardio: ___extremities free from edema ____pedal pulses palpable    GI/Abdomen: ___soft _____ Nontender______Nondistended_____HSM    Lymphatic: ___no palpable nodes in neck  ___no palpable nodes calves and feet    Skin/Wound: ___Incision, __x_Dressing c/d/i,   ____surrounding tissues soft,  ___drain/chest tube present____    Muscular: EHL _5__/5  Gastrocnemius___/5    _x__absent clubbing/cyanosis         ASSESSMENT:  This is a 51y old Male with a history of:  NODULE R91.1  Chronic back pain  Hypercholesterolemia  Diabetes mellitus  COPD (chronic obstructive pulmonary disease)  Lung nodule  Right lower lobe lung mass  Robot-assisted lobectomy of right lung with video-assisted thoracoscopic surgery (VATS)  H/O neck surgery  History of back surgery  opioid dependence, right sided chest pain S/P VATS RLL lobectomy      Recommended Treatment PLAN:  1.  Methadone 10 mg po qd  2.  Gabapentin 300 mg BID  3.  Toradol 30 mg IV q6h prn x 6 doses  4.  Percocet 5/325 2 tabs po q4h prn

## 2017-02-24 NOTE — PROGRESS NOTE ADULT - SUBJECTIVE AND OBJECTIVE BOX
Patient discussed on morning rounds with Dr. Cherry      Operation / Date: 2/22/17 RVATS RA RLL lobectomy with LND     SUBJECTIVE ASSESSMENT:  Patient SOB with ambulation.  Pain well controlled with PO medications.  Denies HA, dizziness, CP, palpitations, N/V/D/C, leg swelling/calf pain.     Vital Signs Last 24 Hrs  T(C): 36.4, Max: 36.7 (02-23 @ 14:33)  T(F): 97.6, Max: 98.1 (02-23 @ 14:33)  HR: 102 (86 - 112)  BP: 125/85 (108/65 - 127/80)  BP(mean): 99 (81 - 99)  RR: 26 (16 - 26)  SpO2: 94% (92% - 97%)  I&O's Detail  I & Os for 24h ending 24 Feb 2017 07:00  =============================================  IN:    Solution: 850 ml    Oral Fluid: 800 ml    Total IN: 1650 ml  ---------------------------------------------  OUT:    Voided: 1550 ml    Total OUT: 1550 ml  ---------------------------------------------  Total NET: 100 ml    I & Os for current day (as of 24 Feb 2017 14:30)  =============================================  IN:    Oral Fluid: 480 ml    Total IN: 480 ml  ---------------------------------------------  OUT:    Total OUT: 0 ml  ---------------------------------------------  Total NET: 480 ml      CHEST TUBE: No.   MELISSA DRAIN:  No.  EPICARDIAL WIRES: No.  TIE DOWNS: Yes  MOORE: No.    PHYSICAL EXAM:    General: Sitting up in bed tachypneic coming back from walk     Neurological: A&O x 3 moves all extremities, no focal defecits     Cardiovascular: S1S2 RRR No M/G/R    Respiratory: Diminished BS b/l lung fields, no w/r/r appreciated    Gastrointestinal: BS normoactive x 4 soft NT/ND    Extremities: No LE edema, no calf tenderness b/l    Vascular: B/L pulses 2 + extremities WWP     Incision Sites: VATs incisions clean, no drainage, no erythema    LABS:                        12.7   15.0  )-----------( 303      ( 24 Feb 2017 06:34 )             39.0       COUMADIN:  No.       24 Feb 2017 06:34    136    |  100    |  16     ----------------------------<  106    4.1     |  32     |  0.82     Ca    8.7        24 Feb 2017 06:34  Phos  2.1       24 Feb 2017 06:34  Mg     2.2       24 Feb 2017 06:34            MEDICATIONS  (STANDING):  famotidine    Tablet 20milliGRAM(s) Oral every 12 hours  docusate sodium 100milliGRAM(s) Oral three times a day  insulin lispro (HumaLOG) corrective regimen sliding scale  SubCutaneous Before meals and at bedtime  dextrose 5%. 1000milliLiter(s) IV Continuous <Continuous>  dextrose 50% Injectable 12.5Gram(s) IV Push once  dextrose 50% Injectable 25Gram(s) IV Push once  dextrose 50% Injectable 25Gram(s) IV Push once  heparin  Injectable 7500Unit(s) SubCutaneous every 8 hours  acetaminophen  IVPB. 1000milliGRAM(s) IV Intermittent once  nicotine -  14 mG/24Hr(s) Patch 1patch Transdermal daily  gabapentin 300milliGRAM(s) Oral two times a day  buPROPion XL . 150milliGRAM(s) Oral daily  methadone 10milliGRAM(s) Oral daily  levalbuterol Inhalation 0.63milliGRAM(s) Inhalation every 6 hours  buDESOnide   0.25 milliGRAM(s) Respule 0.25milliGRAM(s) Inhalation every 12 hours  fluticasone propionate 50 MICROgram(s)/spray Nasal Spray 1Spray(s) Both Nostrils two times a day    MEDICATIONS  (PRN):  ketorolac   Injectable 30milliGRAM(s) IV Push every 6 hours PRN Moderate Pain  dextrose Gel 1Dose(s) Oral once PRN Blood Glucose LESS THAN 70 milliGRAM(s)/deciliter  glucagon  Injectable 1milliGRAM(s) IntraMuscular once PRN Glucose LESS THAN 70 milligrams/deciliter  oxyCODONE  5 mG/acetaminophen 325 mG 1Tablet(s) Oral every 4 hours PRN Moderate Pain (4 - 6)  oxyCODONE  5 mG/acetaminophen 325 mG 2Tablet(s) Oral every 4 hours PRN Severe Pain (7 - 10)        RADIOLOGY & ADDITIONAL TESTS:    CXR 2/24/17 no interval changes

## 2017-02-24 NOTE — PROGRESS NOTE ADULT - ASSESSMENT
52 yo M current smoker with PMHx COPD, DM, chronic LBP, and biopsy proven NSCLC POD 2 from R VATS robotic assisted RLL lobectomy with LND.      Neuro/Pain  -Pain well controlled with current PO regimen  -appreciate pain management recs  -con't nicotine patch for smoking cessation    Resp-   -con't IS/ambulation   -CXR stable  -Patient de saturating on RA at rest and with ambulation to 87 and 81 respectively.   -Start pulmicort BID with xoponex Q6H standing   -con't to try to wean NC to maintain O2 sat > 90 %    CV- HD stable  -con't to monitor tele     GI-   -GI ppx with pepcid   -Bowel regimen with colace senna and miralax prn   -PO diet    Renal/  -BUN/Cr stable  -making appropriate urine  -con't to monitor I/Os    Heme-   H/H stable  HSQ for DVT ppx     Endo-   FS controlled, con't ISS    ID-  WBC trending down, afebrile  monitor for SIRS     Dispo  Home pending improvement in oxygenation

## 2017-02-25 ENCOUNTER — TRANSCRIPTION ENCOUNTER (OUTPATIENT)
Age: 52
End: 2017-02-25

## 2017-02-25 VITALS
OXYGEN SATURATION: 93 % | RESPIRATION RATE: 20 BRPM | DIASTOLIC BLOOD PRESSURE: 80 MMHG | SYSTOLIC BLOOD PRESSURE: 139 MMHG

## 2017-02-25 LAB
ANION GAP SERPL CALC-SCNC: 6 MMOL/L — LOW (ref 9–16)
BUN SERPL-MCNC: 11 MG/DL — SIGNIFICANT CHANGE UP (ref 7–23)
CALCIUM SERPL-MCNC: 8.8 MG/DL — SIGNIFICANT CHANGE UP (ref 8.5–10.5)
CHLORIDE SERPL-SCNC: 102 MMOL/L — SIGNIFICANT CHANGE UP (ref 96–108)
CO2 SERPL-SCNC: 28 MMOL/L — SIGNIFICANT CHANGE UP (ref 22–31)
CREAT SERPL-MCNC: 0.66 MG/DL — SIGNIFICANT CHANGE UP (ref 0.5–1.3)
GLUCOSE SERPL-MCNC: 97 MG/DL — SIGNIFICANT CHANGE UP (ref 70–99)
HCT VFR BLD CALC: 37 % — LOW (ref 39–50)
HGB BLD-MCNC: 12.5 G/DL — LOW (ref 13–17)
MAGNESIUM SERPL-MCNC: 2.3 MG/DL — SIGNIFICANT CHANGE UP (ref 1.6–2.4)
MCHC RBC-ENTMCNC: 29.5 PG — SIGNIFICANT CHANGE UP (ref 27–34)
MCHC RBC-ENTMCNC: 33.8 G/DL — SIGNIFICANT CHANGE UP (ref 32–36)
MCV RBC AUTO: 87.3 FL — SIGNIFICANT CHANGE UP (ref 80–100)
PHOSPHATE SERPL-MCNC: 3.4 MG/DL — SIGNIFICANT CHANGE UP (ref 2.5–4.5)
PLATELET # BLD AUTO: 284 K/UL — SIGNIFICANT CHANGE UP (ref 150–400)
POTASSIUM SERPL-MCNC: 3.9 MMOL/L — SIGNIFICANT CHANGE UP (ref 3.5–5.3)
POTASSIUM SERPL-SCNC: 3.9 MMOL/L — SIGNIFICANT CHANGE UP (ref 3.5–5.3)
RBC # BLD: 4.24 M/UL — SIGNIFICANT CHANGE UP (ref 4.2–5.8)
RBC # FLD: 14.1 % — SIGNIFICANT CHANGE UP (ref 10.3–16.9)
SODIUM SERPL-SCNC: 136 MMOL/L — SIGNIFICANT CHANGE UP (ref 135–145)
WBC # BLD: 12.1 K/UL — HIGH (ref 3.8–10.5)
WBC # FLD AUTO: 12.1 K/UL — HIGH (ref 3.8–10.5)

## 2017-02-25 PROCEDURE — 94640 AIRWAY INHALATION TREATMENT: CPT

## 2017-02-25 PROCEDURE — 82550 ASSAY OF CK (CPK): CPT

## 2017-02-25 PROCEDURE — 84484 ASSAY OF TROPONIN QUANT: CPT

## 2017-02-25 PROCEDURE — 82553 CREATINE MB FRACTION: CPT

## 2017-02-25 PROCEDURE — 36415 COLL VENOUS BLD VENIPUNCTURE: CPT

## 2017-02-25 PROCEDURE — 80048 BASIC METABOLIC PNL TOTAL CA: CPT

## 2017-02-25 PROCEDURE — 83735 ASSAY OF MAGNESIUM: CPT

## 2017-02-25 PROCEDURE — 93005 ELECTROCARDIOGRAM TRACING: CPT

## 2017-02-25 PROCEDURE — 86850 RBC ANTIBODY SCREEN: CPT

## 2017-02-25 PROCEDURE — 82803 BLOOD GASES ANY COMBINATION: CPT

## 2017-02-25 PROCEDURE — 88331 PATH CONSLTJ SURG 1 BLK 1SPC: CPT

## 2017-02-25 PROCEDURE — 88309 TISSUE EXAM BY PATHOLOGIST: CPT

## 2017-02-25 PROCEDURE — 71045 X-RAY EXAM CHEST 1 VIEW: CPT

## 2017-02-25 PROCEDURE — 83036 HEMOGLOBIN GLYCOSYLATED A1C: CPT

## 2017-02-25 PROCEDURE — 84100 ASSAY OF PHOSPHORUS: CPT

## 2017-02-25 PROCEDURE — 86900 BLOOD TYPING SEROLOGIC ABO: CPT

## 2017-02-25 PROCEDURE — 85025 COMPLETE CBC W/AUTO DIFF WBC: CPT

## 2017-02-25 PROCEDURE — 85610 PROTHROMBIN TIME: CPT

## 2017-02-25 PROCEDURE — 71010: CPT | Mod: 26

## 2017-02-25 PROCEDURE — 85730 THROMBOPLASTIN TIME PARTIAL: CPT

## 2017-02-25 PROCEDURE — 88332 PATH CONSLTJ SURG EA ADD BLK: CPT

## 2017-02-25 PROCEDURE — 86901 BLOOD TYPING SEROLOGIC RH(D): CPT

## 2017-02-25 PROCEDURE — S2900: CPT

## 2017-02-25 PROCEDURE — 88305 TISSUE EXAM BY PATHOLOGIST: CPT

## 2017-02-25 PROCEDURE — 85027 COMPLETE CBC AUTOMATED: CPT

## 2017-02-25 RX ORDER — METFORMIN HYDROCHLORIDE 850 MG/1
1 TABLET ORAL
Qty: 60 | Refills: 0 | OUTPATIENT
Start: 2017-02-25 | End: 2017-03-27

## 2017-02-25 RX ORDER — GABAPENTIN 400 MG/1
1 CAPSULE ORAL
Qty: 0 | Refills: 0 | COMMUNITY

## 2017-02-25 RX ORDER — METFORMIN HYDROCHLORIDE 850 MG/1
1 TABLET ORAL
Qty: 0 | Refills: 0 | COMMUNITY

## 2017-02-25 RX ORDER — METHADONE HYDROCHLORIDE 40 MG/1
5 TABLET ORAL
Qty: 0 | Refills: 0 | COMMUNITY

## 2017-02-25 RX ORDER — BUPROPION HYDROCHLORIDE 150 MG/1
1 TABLET, EXTENDED RELEASE ORAL
Qty: 30 | Refills: 0 | OUTPATIENT
Start: 2017-02-25 | End: 2017-03-27

## 2017-02-25 RX ORDER — DOCUSATE SODIUM 100 MG
1 CAPSULE ORAL
Qty: 90 | Refills: 0 | OUTPATIENT
Start: 2017-02-25 | End: 2017-03-27

## 2017-02-25 RX ORDER — METHADONE HYDROCHLORIDE 40 MG/1
1 TABLET ORAL
Qty: 7 | Refills: 0 | OUTPATIENT
Start: 2017-02-25 | End: 2017-03-04

## 2017-02-25 RX ORDER — FLUTICASONE PROPIONATE 50 MCG
1 SPRAY, SUSPENSION NASAL
Qty: 1 | Refills: 0 | OUTPATIENT
Start: 2017-02-25 | End: 2017-03-27

## 2017-02-25 RX ORDER — FAMOTIDINE 10 MG/ML
1 INJECTION INTRAVENOUS
Qty: 60 | Refills: 0 | OUTPATIENT
Start: 2017-02-25 | End: 2017-03-27

## 2017-02-25 RX ORDER — POTASSIUM CHLORIDE 20 MEQ
10 PACKET (EA) ORAL ONCE
Qty: 0 | Refills: 0 | Status: COMPLETED | OUTPATIENT
Start: 2017-02-25 | End: 2017-02-25

## 2017-02-25 RX ORDER — SENNA PLUS 8.6 MG/1
2 TABLET ORAL
Qty: 60 | Refills: 0 | OUTPATIENT
Start: 2017-02-25 | End: 2017-03-27

## 2017-02-25 RX ORDER — BUPROPION HYDROCHLORIDE 150 MG/1
1 TABLET, EXTENDED RELEASE ORAL
Qty: 0 | Refills: 0 | COMMUNITY

## 2017-02-25 RX ORDER — BUDESONIDE, MICRONIZED 100 %
2 POWDER (GRAM) MISCELLANEOUS
Qty: 1 | Refills: 0 | OUTPATIENT
Start: 2017-02-25 | End: 2017-03-27

## 2017-02-25 RX ORDER — GABAPENTIN 400 MG/1
1 CAPSULE ORAL
Qty: 60 | Refills: 0 | OUTPATIENT
Start: 2017-02-25 | End: 2017-03-27

## 2017-02-25 RX ADMIN — LEVALBUTEROL 0.63 MILLIGRAM(S): 1.25 SOLUTION, CONCENTRATE RESPIRATORY (INHALATION) at 11:54

## 2017-02-25 RX ADMIN — Medication 1 PATCH: at 11:32

## 2017-02-25 RX ADMIN — FAMOTIDINE 20 MILLIGRAM(S): 10 INJECTION INTRAVENOUS at 06:35

## 2017-02-25 RX ADMIN — Medication 1 SPRAY(S): at 07:13

## 2017-02-25 RX ADMIN — GABAPENTIN 300 MILLIGRAM(S): 400 CAPSULE ORAL at 06:35

## 2017-02-25 RX ADMIN — LEVALBUTEROL 0.63 MILLIGRAM(S): 1.25 SOLUTION, CONCENTRATE RESPIRATORY (INHALATION) at 00:37

## 2017-02-25 RX ADMIN — METHADONE HYDROCHLORIDE 10 MILLIGRAM(S): 40 TABLET ORAL at 11:31

## 2017-02-25 RX ADMIN — Medication 100 MILLIGRAM(S): at 06:35

## 2017-02-25 RX ADMIN — HEPARIN SODIUM 7500 UNIT(S): 5000 INJECTION INTRAVENOUS; SUBCUTANEOUS at 06:34

## 2017-02-25 RX ADMIN — Medication 10 MILLIEQUIVALENT(S): at 08:30

## 2017-02-25 RX ADMIN — Medication 0.25 MILLIGRAM(S): at 07:20

## 2017-02-25 RX ADMIN — BUPROPION HYDROCHLORIDE 150 MILLIGRAM(S): 150 TABLET, EXTENDED RELEASE ORAL at 11:32

## 2017-02-25 RX ADMIN — LEVALBUTEROL 0.63 MILLIGRAM(S): 1.25 SOLUTION, CONCENTRATE RESPIRATORY (INHALATION) at 06:35

## 2017-02-25 NOTE — DISCHARGE NOTE ADULT - PLAN OF CARE
Recover from Surgery -Dr. Roberts would like you to follow up with him within 1 week of discharge.  Because you have been discharged on the weekend, we were unable to schedule your appointment.  Please call on Monday, 3/27/17 to schedule your appointment.  The office is located at [facility], [facility], 4th floor. Call us with any questions #[phone], option [phone].    -Please call your primary care physician to schedule a follow up appointment within 2 weeks of discharge.     -Your oxygen saturation become low when you walk.  We have ordered home oxygen that will be delivered next week.  In the meantime, please avoid any strenuous activity, take regular breaks when walking, and rest when you feel short of breath.  If you become extremely short of breath, please visit your local emergency department and call our office.      -Walk daily as tolerated and use your incentive spirometer every hour.    -No driving or strenuous activity/exercise for 6 weeks, or until  cleared by your surgeon.    -Gently clean your incisions with anti-bacterial soap and water, pat  dry.  You may leave them open to air.    -Call your doctor if you have shortness of breath, chest pain not  relieved by pain medication, dizziness, fever >101.5, or increased  redness or drainage from incisions.

## 2017-02-25 NOTE — DISCHARGE NOTE ADULT - CARE PROVIDER_API CALL
Kenyon Roberts (MD), Surgery; Thoracic Surgery  [address] [place] [address]  Phone: [phone]  Fax: [phone]
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
## 2017-02-25 NOTE — PROGRESS NOTE ADULT - ASSESSMENT
-Dr. Roberts would like you to follow up with him within 1 week of discharge.  Because you have been discharged on the weekend, we were unable to schedule your appointment.  Please call on Monday, 3/27/17 to schedule your appointment.  The office is located at [facility], [facility], 4th floor. Call us with any questions #[phone], option 4.    -Please call your primary care physician to schedule a follow up appointment within 2 weeks of discharge.     -Your oxygen saturation becomes low when you walk.  We have ordered home oxygen that will be delivered next week.  In the meantime, please avoid any strenuous activity, take regular breaks when walking, and rest when you feel short of breath.  If you become extremely short of breath, please visit your local emergency department and call our office.      -Walk daily as tolerated and use your incentive spirometer every hour.

## 2017-02-25 NOTE — PROGRESS NOTE ADULT - SUBJECTIVE AND OBJECTIVE BOX
Pt. seen at 1018  Pain Management Progress Note - [facility] [phone]    HPI:  Pt. states his pain is controlled on the current regimen.   Percocet 5/325 2 tabs prn is controlling his pain, worse on the right side.  He has opiates at home..              Pertinent PMH: Pain at: ___Back ___Neck___Knee ___Hip ___Shoulder _x__ Opioid tolerance    Pain is ___ sharp ____dull ___burning ___achy ___ Intensity: ____ mild ____mod ____severe     Location __x___surgical site _____cervical _____lumbar ____abd _____upper ext____lower ext    Worse with __x__activity ___x_movement _____physical therapy___ Rest    Improved with _x___medication ___x_rest ____physical therapy      ketorolac   Injectable  famotidine    Tablet  docusate sodium  insulin lispro (HumaLOG) corrective regimen sliding scale  dextrose 5%.  dextrose Gel  dextrose 50% Injectable  dextrose 50% Injectable  dextrose 50% Injectable  glucagon  Injectable  heparin  Injectable  acetaminophen  IVPB.  nicotine -  14 mG/24Hr(s) Patch  gabapentin  buPROPion XL .  methadone  (  oxyCODONE  5 mG/acetaminophen 325 mG  oxyCODONE  5 mG/acetaminophen 325 mG  buDESOnide   0.25 milliGRAM(s) Respule  levalbuterol Inhalation      ROS: Const:  ___febrile   Eyes:___ENT:___CV: _+__chest pain  Resp: __+__sob  GI:__-_nausea __-_vomiting ____abd pain ___npo ___clears _+__full diet __bm  :___ Musk: _+__pain ___spasm  Skin:___ Neuro:  _-__xwsfntuu_-__hsqmrzafv__-__ numbness _-__weakness ___paresthesia  Psych:___anxiety  Endo:___ Heme:___Allergy:___      ICU Vital Signs Last 24 Hrs  T(C): 36.4, Max: 37.6 (02-24 @ 16:32)  T(F): 97.6, Max: 99.7 (02-24 @ 16:32)  HR: 79 (79 - 106)  BP: 113/75 (113/75 - 132/83)  BP(mean): 87 (87 - 99)  ABP: --  ABP(mean): --  RR: 12 (12 - 26)  SpO2: 93% (92% - 96%)     PHYSICAL EXAM:  Gen Appearance: _x__no acute distress _x__appropriate         Neuro: _x__SILT feet__x__ EOM Intact Psych: AAOX_3_, _x__mood/affect appropriate        Eyes: __x_conjunctiva WNL  ___x__ Pupils equal and round        ENT: _x__ears and nose atraumatic__x_ Hearing grossly intact        Neck: ___trachea midline, no visible masses ___thyroid without palpable mass    Resp: _x__Nml WOB____No tactile fremitus ___clear to auscultation    Cardio: ___extremities free from edema ____pedal pulses palpable    GI/Abdomen: ___soft _____ Nontender______Nondistended_____HSM    Lymphatic: ___no palpable nodes in neck  ___no palpable nodes calves and feet    Skin/Wound: ___Incision, __x_Dressing c/d/i,   ____surrounding tissues soft,  ___drain/chest tube present____    Muscular: EHL _5__/5  Gastrocnemius___/5    _x__absent clubbing/cyanosis         ASSESSMENT:  This is a 51y old Male with a history of:  NODULE R91.1  Chronic back pain  Hypercholesterolemia  Diabetes mellitus  COPD (chronic obstructive pulmonary disease)  Lung nodule  Right lower lobe lung mass  Robot-assisted lobectomy of right lung with video-assisted thoracoscopic surgery (VATS)  H/O neck surgery  History of back surgery  opioid dependence, right sided chest pain S/P VATS RLL lobectomy, seems ready for discharge from pain perspective      Recommended Treatment PLAN:  1.  Methadone 10 mg po qd  2.  Gabapentin 300 mg BID  3.  Toradol 30 mg IV q6h prn x 6 doses  4.  Percocet 5/325 2 tabs po q4h prn

## 2017-02-25 NOTE — DISCHARGE NOTE ADULT - MEDICATION SUMMARY - MEDICATIONS TO TAKE
I will START or STAY ON the medications listed below when I get home from the hospital:    Pulmicort Respules 0.25 mg/2 mL inhalation suspension  -- 2 milliliter(s) inhaled every 12 hours  -- For inhalation only.  Rinse mouth thoroughly after use.  Shake well before use.    -- Indication: For Inhaler, COPD    Percocet 5/325 oral tablet  -- 1 tab(s) by mouth every 4 hours, As Needed -Severe Pain (7 - 10) - 10) MDD:6  -- Indication: For Severe Pain    methadone 10 mg oral tablet  -- 1 tab(s) by mouth once a day MDD:1  -- Caution federal law prohibits the transfer of this drug to any person other  than the person for whom it was prescribed.  May cause drowsiness.  Alcohol may intensify this effect.  Use care when operating dangerous machinery.  This prescription cannot be refilled.  Using more of this medication than prescribed may cause serious breathing problems.    -- Indication: For Severe Pain    Neurontin 300 mg oral capsule  -- 1 cap(s) by mouth 2 times a day  -- Indication: For Pain    metFORMIN 500 mg oral tablet  -- 1 milligram(s) by mouth 2 times a day  -- Indication: For Diabetes mellitus    guaiFENesin 600 mg oral tablet, extended release  -- 1 tab(s) by mouth every 12 hours MDD:2  -- Medication should be taken with plenty of water.  Swallow whole.  Do not crush.    -- Indication: For Mucous thinner    famotidine 20 mg oral tablet  -- 1 tab(s) by mouth every 12 hours  -- Indication: For Ulcer protection    docusate sodium 100 mg oral capsule  -- 1 cap(s) by mouth 3 times a day  -- Indication: For Stool softener    senna oral tablet  -- 2 tab(s) by mouth once a day (at bedtime)  -- Indication: For Stool softener    fluticasone 50 mcg/inh nasal spray  -- 1 spray(s) into nose 2 times a day  -- Indication: For Nasal spray    Wellbutrin  mg/24 hours oral tablet, extended release  -- 1 tab(s) by mouth once a day  -- Indication: For Smoke cessation

## 2017-02-25 NOTE — DISCHARGE NOTE ADULT - CARE PROVIDERS DIRECT ADDRESSES
[email or web address]
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
## 2017-02-25 NOTE — DISCHARGE NOTE ADULT - MEDICATION SUMMARY - MEDICATIONS TO STOP TAKING
I will STOP taking the medications listed below when I get home from the hospital:    methadone 10 mg/5 mL oral solution  -- 5 milliliter(s) by mouth every 8 hours

## 2017-02-25 NOTE — DISCHARGE NOTE ADULT - PATIENT PORTAL LINK FT
“You can access the [employer] Patient Portal, offered by [facility], by registering with the following website: [email or web address]”

## 2017-02-25 NOTE — DISCHARGE NOTE ADULT - CARE PLAN
Principal Discharge DX:	Lung nodule  Goal:	Recover from Surgery  Instructions for follow-up, activity and diet:	-Dr. Roberts would like you to follow up with him within 1 week of discharge.  Because you have been discharged on the weekend, we were unable to schedule your appointment.  Please call on Monday, 3/27/17 to schedule your appointment.  The office is located at [facility], [facility], 4th floor. Call us with any questions #[phone], option 4.    -Please call your primary care physician to schedule a follow up appointment within 2 weeks of discharge.     -Your oxygen saturation become low when you walk.  We have ordered home oxygen that will be delivered next week.  In the meantime, please avoid any strenuous activity, take regular breaks when walking, and rest when you feel short of breath.  If you become extremely short of breath, please visit your local emergency department and call our office.      -Walk daily as tolerated and use your incentive spirometer every hour.    -No driving or strenuous activity/exercise for 6 weeks, or until  cleared by your surgeon.    -Gently clean your incisions with anti-bacterial soap and water, pat  dry.  You may leave them open to air.    -Call your doctor if you have shortness of breath, chest pain not  relieved by pain medication, dizziness, fever >101.5, or increased  redness or drainage from incisions. Principal Discharge DX:	Lung nodule  Goal:	Recover from Surgery  Instructions for follow-up, activity and diet:	-Dr. Roberts would like you to follow up with him within 1 week of discharge.  Because you have been discharged on the weekend, we were unable to schedule your appointment.  Please call on Monday, 3/27/17 to schedule your appointment.  The office is located at [facility], [facility], 4th floor. Call us with any questions #[phone], option 4.    -Please call your primary care physician to schedule a follow up appointment within 2 weeks of discharge.     -Your oxygen saturation become low when you walk.  We have ordered home oxygen that will be delivered next week.  In the meantime, please avoid any strenuous activity, take regular breaks when walking, and rest when you feel short of breath.  If you become extremely short of breath, please visit your local emergency department and call our office.      -Walk daily as tolerated and use your incentive spirometer every hour.    -No driving or strenuous activity/exercise for 6 weeks, or until  cleared by your surgeon.    -Gently clean your incisions with anti-bacterial soap and water, pat  dry.  You may leave them open to air.    -Call your doctor if you have shortness of breath, chest pain not  relieved by pain medication, dizziness, fever >101.5, or increased  redness or drainage from incisions. Principal Discharge DX:	Lung nodule  Goal:	Recover from Surgery  Instructions for follow-up, activity and diet:	-Dr. Roberts would like you to follow up with him within 1 week of discharge.  Because you have been discharged on the weekend, we were unable to schedule your appointment.  Please call on Monday, 3/27/17 to schedule your appointment.  The office is located at [facility], 4th floor. Call us with any questions #[phone], option 4.    -Please call your primary care physician to schedule a follow up appointment within 2 weeks of discharge.     -Your oxygen saturation become low when you walk.  We have ordered home oxygen that will be delivered next week.  In the meantime, please avoid any strenuous activity, take regular breaks when walking, and rest when you feel short of breath.  If you become extremely short of breath, please visit your local emergency department and call our office.      -Walk daily as tolerated and use your incentive spirometer every hour.    -No driving or strenuous activity/exercise for 6 weeks, or until  cleared by your surgeon.    -Gently clean your incisions with anti-bacterial soap and water, pat  dry.  You may leave them open to air.    -Call your doctor if you have shortness of breath, chest pain not  relieved by pain medication, dizziness, fever >101.5, or increased  redness or drainage from incisions.

## 2017-02-28 PROBLEM — E78.00 PURE HYPERCHOLESTEROLEMIA, UNSPECIFIED: Chronic | Status: ACTIVE | Noted: 2017-02-21

## 2017-02-28 PROBLEM — R91.1 SOLITARY PULMONARY NODULE: Chronic | Status: ACTIVE | Noted: 2017-02-21

## 2017-02-28 PROBLEM — J44.9 CHRONIC OBSTRUCTIVE PULMONARY DISEASE, UNSPECIFIED: Chronic | Status: ACTIVE | Noted: 2017-02-21

## 2017-02-28 PROBLEM — E11.9 TYPE 2 DIABETES MELLITUS WITHOUT COMPLICATIONS: Chronic | Status: ACTIVE | Noted: 2017-02-21

## 2017-03-01 DIAGNOSIS — J45.909 UNSPECIFIED ASTHMA, UNCOMPLICATED: ICD-10-CM

## 2017-03-01 DIAGNOSIS — Z79.891 LONG TERM (CURRENT) USE OF OPIATE ANALGESIC: ICD-10-CM

## 2017-03-01 DIAGNOSIS — Z98.1 ARTHRODESIS STATUS: ICD-10-CM

## 2017-03-01 DIAGNOSIS — F17.210 NICOTINE DEPENDENCE, CIGARETTES, UNCOMPLICATED: ICD-10-CM

## 2017-03-01 DIAGNOSIS — E78.5 HYPERLIPIDEMIA, UNSPECIFIED: ICD-10-CM

## 2017-03-01 DIAGNOSIS — Z88.0 ALLERGY STATUS TO PENICILLIN: ICD-10-CM

## 2017-03-01 DIAGNOSIS — J44.9 CHRONIC OBSTRUCTIVE PULMONARY DISEASE, UNSPECIFIED: ICD-10-CM

## 2017-03-01 DIAGNOSIS — R91.1 SOLITARY PULMONARY NODULE: ICD-10-CM

## 2017-03-01 DIAGNOSIS — R93.3 ABNORMAL FINDINGS ON DIAGNOSTIC IMAGING OF OTHER PARTS OF DIGESTIVE TRACT: ICD-10-CM

## 2017-03-01 DIAGNOSIS — E11.9 TYPE 2 DIABETES MELLITUS WITHOUT COMPLICATIONS: ICD-10-CM

## 2017-03-01 DIAGNOSIS — C34.31 MALIGNANT NEOPLASM OF LOWER LOBE, RIGHT BRONCHUS OR LUNG: ICD-10-CM

## 2017-03-01 DIAGNOSIS — M54.5 LOW BACK PAIN: ICD-10-CM

## 2017-03-01 DIAGNOSIS — Z79.84 LONG TERM (CURRENT) USE OF ORAL HYPOGLYCEMIC DRUGS: ICD-10-CM

## 2017-03-01 RX ORDER — FLUTICASONE PROPIONATE 50 UG/1
50 SPRAY, METERED NASAL TWICE DAILY
Refills: 0 | Status: ACTIVE | COMMUNITY

## 2017-03-01 RX ORDER — GABAPENTIN 300 MG/1
300 CAPSULE ORAL TWICE DAILY
Refills: 0 | Status: ACTIVE | COMMUNITY

## 2017-03-02 ENCOUNTER — APPOINTMENT (OUTPATIENT)
Dept: THORACIC SURGERY | Facility: CLINIC | Age: 52
End: 2017-03-02

## 2017-03-02 ENCOUNTER — OUTPATIENT (OUTPATIENT)
Dept: OUTPATIENT SERVICES | Facility: HOSPITAL | Age: 52
LOS: 1 days | End: 2017-03-02
Payer: MEDICARE

## 2017-03-02 VITALS
HEART RATE: 105 BPM | WEIGHT: 258 LBS | SYSTOLIC BLOOD PRESSURE: 138 MMHG | DIASTOLIC BLOOD PRESSURE: 70 MMHG | OXYGEN SATURATION: 95 % | TEMPERATURE: 97.2 F | RESPIRATION RATE: 19 BRPM

## 2017-03-02 DIAGNOSIS — Z98.890 OTHER SPECIFIED POSTPROCEDURAL STATES: Chronic | ICD-10-CM

## 2017-03-02 PROCEDURE — 71046 X-RAY EXAM CHEST 2 VIEWS: CPT

## 2017-03-02 PROCEDURE — 71020: CPT | Mod: 26

## 2017-03-07 LAB
CULTURE RESULTS: SIGNIFICANT CHANGE UP
SPECIMEN SOURCE: SIGNIFICANT CHANGE UP

## 2017-03-09 ENCOUNTER — APPOINTMENT (OUTPATIENT)
Dept: THORACIC SURGERY | Facility: CLINIC | Age: 52
End: 2017-03-09

## 2017-03-09 ENCOUNTER — OUTPATIENT (OUTPATIENT)
Dept: OUTPATIENT SERVICES | Facility: HOSPITAL | Age: 52
LOS: 1 days | End: 2017-03-09
Payer: MEDICARE

## 2017-03-09 VITALS
HEART RATE: 100 BPM | WEIGHT: 252 LBS | SYSTOLIC BLOOD PRESSURE: 128 MMHG | RESPIRATION RATE: 20 BRPM | OXYGEN SATURATION: 95 % | DIASTOLIC BLOOD PRESSURE: 82 MMHG | TEMPERATURE: 97.6 F

## 2017-03-09 DIAGNOSIS — Z98.890 OTHER SPECIFIED POSTPROCEDURAL STATES: Chronic | ICD-10-CM

## 2017-03-09 DIAGNOSIS — Z09 ENCOUNTER FOR FOLLOW-UP EXAMINATION AFTER COMPLETED TREATMENT FOR CONDITIONS OTHER THAN MALIGNANT NEOPLASM: ICD-10-CM

## 2017-03-09 PROCEDURE — 71020: CPT | Mod: 26

## 2017-03-09 PROCEDURE — 71046 X-RAY EXAM CHEST 2 VIEWS: CPT

## 2017-03-10 PROBLEM — Z09 POSTOP CHECK: Status: ACTIVE | Noted: 2017-03-01

## 2017-04-07 ENCOUNTER — APPOINTMENT (OUTPATIENT)
Dept: THORACIC SURGERY | Facility: CLINIC | Age: 52
End: 2017-04-07

## 2017-04-12 ENCOUNTER — APPOINTMENT (OUTPATIENT)
Dept: THORACIC SURGERY | Facility: CLINIC | Age: 52
End: 2017-04-12

## 2017-04-12 ENCOUNTER — OUTPATIENT (OUTPATIENT)
Dept: OUTPATIENT SERVICES | Facility: HOSPITAL | Age: 52
LOS: 1 days | End: 2017-04-12
Payer: MEDICARE

## 2017-04-12 VITALS
TEMPERATURE: 97.1 F | WEIGHT: 247 LBS | DIASTOLIC BLOOD PRESSURE: 71 MMHG | HEART RATE: 84 BPM | RESPIRATION RATE: 18 BRPM | OXYGEN SATURATION: 97 % | SYSTOLIC BLOOD PRESSURE: 123 MMHG

## 2017-04-12 DIAGNOSIS — Z98.890 OTHER SPECIFIED POSTPROCEDURAL STATES: Chronic | ICD-10-CM

## 2017-04-12 PROCEDURE — 71046 X-RAY EXAM CHEST 2 VIEWS: CPT

## 2017-04-12 PROCEDURE — 71020: CPT | Mod: 26

## 2017-04-12 RX ORDER — GUAIFENESIN 600 MG/1
600 TABLET, EXTENDED RELEASE ORAL
Refills: 0 | Status: DISCONTINUED | COMMUNITY
End: 2017-04-12

## 2017-04-12 RX ORDER — SENNOSIDES 8.6 MG/1
187 TABLET ORAL
Refills: 0 | Status: DISCONTINUED | COMMUNITY
End: 2017-04-12

## 2017-04-12 RX ORDER — METHADONE HYDROCHLORIDE 10 MG/5ML
10 SOLUTION ORAL
Refills: 0 | Status: DISCONTINUED | COMMUNITY
End: 2017-04-12

## 2017-04-12 RX ORDER — METFORMIN HYDROCHLORIDE 500 MG/1
500 TABLET, COATED ORAL
Refills: 0 | Status: DISCONTINUED | COMMUNITY
End: 2017-04-12

## 2017-07-13 ENCOUNTER — FORM ENCOUNTER (OUTPATIENT)
Age: 52
End: 2017-07-13

## 2017-07-14 ENCOUNTER — OUTPATIENT (OUTPATIENT)
Dept: OUTPATIENT SERVICES | Facility: HOSPITAL | Age: 52
LOS: 1 days | End: 2017-07-14
Payer: MEDICARE

## 2017-07-14 ENCOUNTER — APPOINTMENT (OUTPATIENT)
Dept: THORACIC SURGERY | Facility: CLINIC | Age: 52
End: 2017-07-14

## 2017-07-14 VITALS
RESPIRATION RATE: 19 BRPM | HEART RATE: 96 BPM | WEIGHT: 238 LBS | SYSTOLIC BLOOD PRESSURE: 125 MMHG | TEMPERATURE: 97.8 F | OXYGEN SATURATION: 95 % | DIASTOLIC BLOOD PRESSURE: 86 MMHG

## 2017-07-14 DIAGNOSIS — Z98.890 OTHER SPECIFIED POSTPROCEDURAL STATES: Chronic | ICD-10-CM

## 2017-07-14 DIAGNOSIS — E11.9 TYPE 2 DIABETES MELLITUS W/OUT COMPLICATIONS: ICD-10-CM

## 2017-07-14 DIAGNOSIS — E78.00 PURE HYPERCHOLESTEROLEMIA, UNSPECIFIED: ICD-10-CM

## 2017-07-14 PROCEDURE — 71046 X-RAY EXAM CHEST 2 VIEWS: CPT

## 2017-07-14 PROCEDURE — 71020: CPT | Mod: 26

## 2017-07-14 RX ORDER — METFORMIN HYDROCHLORIDE 1000 MG/1
1000 TABLET, COATED ORAL
Qty: 60 | Refills: 0 | Status: COMPLETED | COMMUNITY
Start: 2017-04-07 | End: 2017-07-14

## 2017-07-14 RX ORDER — NICOTINE 21 MG/24HR
21 PATCH, TRANSDERMAL 24 HOURS TRANSDERMAL
Qty: 1 | Refills: 2 | Status: COMPLETED | COMMUNITY
Start: 2017-03-10 | End: 2017-07-14

## 2017-07-14 RX ORDER — FAMOTIDINE 20 MG/1
20 TABLET, FILM COATED ORAL
Refills: 0 | Status: COMPLETED | COMMUNITY
End: 2017-07-14

## 2017-07-14 RX ORDER — BUDESONIDE 0.25 MG/2ML
0.25 INHALANT ORAL TWICE DAILY
Qty: 100 | Refills: 0 | Status: COMPLETED | COMMUNITY
End: 2017-07-14

## 2017-07-14 RX ORDER — DOCUSATE SODIUM 100 MG/1
100 CAPSULE ORAL 3 TIMES DAILY
Refills: 0 | Status: COMPLETED | COMMUNITY
End: 2017-07-14

## 2017-07-17 RX ORDER — BUPROPION HYDROCHLORIDE 150 MG/1
150 TABLET, EXTENDED RELEASE ORAL DAILY
Qty: 30 | Refills: 0 | Status: DISCONTINUED | COMMUNITY
End: 2017-07-17

## 2017-10-25 ENCOUNTER — OUTPATIENT (OUTPATIENT)
Dept: OUTPATIENT SERVICES | Facility: HOSPITAL | Age: 52
LOS: 1 days | End: 2017-10-25
Payer: MEDICARE

## 2017-10-25 DIAGNOSIS — Z98.890 OTHER SPECIFIED POSTPROCEDURAL STATES: Chronic | ICD-10-CM

## 2017-10-25 DIAGNOSIS — C34.31 MALIGNANT NEOPLASM OF LOWER LOBE, RIGHT BRONCHUS OR LUNG: ICD-10-CM

## 2017-10-25 PROCEDURE — 94726 PLETHYSMOGRAPHY LUNG VOLUMES: CPT | Mod: 26

## 2017-10-25 PROCEDURE — 94726 PLETHYSMOGRAPHY LUNG VOLUMES: CPT

## 2017-10-25 PROCEDURE — 94060 EVALUATION OF WHEEZING: CPT

## 2017-10-25 PROCEDURE — 94010 BREATHING CAPACITY TEST: CPT | Mod: 26

## 2017-10-25 PROCEDURE — 94729 DIFFUSING CAPACITY: CPT

## 2017-10-25 PROCEDURE — 94760 N-INVAS EAR/PLS OXIMETRY 1: CPT

## 2017-10-25 PROCEDURE — 71250 CT THORAX DX C-: CPT | Mod: 26

## 2017-10-25 PROCEDURE — 71250 CT THORAX DX C-: CPT

## 2017-10-25 PROCEDURE — 94729 DIFFUSING CAPACITY: CPT | Mod: 26

## 2018-04-19 ENCOUNTER — FORM ENCOUNTER (OUTPATIENT)
Age: 53
End: 2018-04-19

## 2018-04-20 ENCOUNTER — APPOINTMENT (OUTPATIENT)
Dept: CT IMAGING | Facility: HOSPITAL | Age: 53
End: 2018-04-20
Payer: MEDICARE

## 2018-04-20 ENCOUNTER — OUTPATIENT (OUTPATIENT)
Dept: OUTPATIENT SERVICES | Facility: HOSPITAL | Age: 53
LOS: 1 days | End: 2018-04-20
Payer: MEDICARE

## 2018-04-20 DIAGNOSIS — Z98.890 OTHER SPECIFIED POSTPROCEDURAL STATES: Chronic | ICD-10-CM

## 2018-04-20 PROCEDURE — 71250 CT THORAX DX C-: CPT

## 2018-04-20 PROCEDURE — 71250 CT THORAX DX C-: CPT | Mod: 26

## 2018-09-26 PROBLEM — M54.9 DORSALGIA, UNSPECIFIED: Chronic | Status: ACTIVE | Noted: 2017-02-22

## 2018-10-08 ENCOUNTER — APPOINTMENT (OUTPATIENT)
Dept: CT IMAGING | Facility: HOSPITAL | Age: 53
End: 2018-10-08
Payer: MEDICARE

## 2018-10-08 ENCOUNTER — OUTPATIENT (OUTPATIENT)
Dept: OUTPATIENT SERVICES | Facility: HOSPITAL | Age: 53
LOS: 1 days | End: 2018-10-08
Payer: MEDICARE

## 2018-10-08 DIAGNOSIS — Z98.890 OTHER SPECIFIED POSTPROCEDURAL STATES: Chronic | ICD-10-CM

## 2018-10-08 PROCEDURE — 71250 CT THORAX DX C-: CPT

## 2018-10-08 PROCEDURE — 71250 CT THORAX DX C-: CPT | Mod: 26

## 2019-04-08 ENCOUNTER — OUTPATIENT (OUTPATIENT)
Dept: OUTPATIENT SERVICES | Facility: HOSPITAL | Age: 54
LOS: 1 days | End: 2019-04-08
Payer: MEDICARE

## 2019-04-08 ENCOUNTER — APPOINTMENT (OUTPATIENT)
Dept: CT IMAGING | Facility: HOSPITAL | Age: 54
End: 2019-04-08
Payer: MEDICARE

## 2019-04-08 DIAGNOSIS — Z98.890 OTHER SPECIFIED POSTPROCEDURAL STATES: Chronic | ICD-10-CM

## 2019-04-08 PROCEDURE — 71250 CT THORAX DX C-: CPT | Mod: 26

## 2019-04-08 PROCEDURE — 71250 CT THORAX DX C-: CPT

## 2019-10-16 ENCOUNTER — APPOINTMENT (OUTPATIENT)
Dept: CT IMAGING | Facility: HOSPITAL | Age: 54
End: 2019-10-16
Payer: MEDICARE

## 2019-10-16 ENCOUNTER — OUTPATIENT (OUTPATIENT)
Dept: OUTPATIENT SERVICES | Facility: HOSPITAL | Age: 54
LOS: 1 days | End: 2019-10-16
Payer: MEDICARE

## 2019-10-16 DIAGNOSIS — Z98.890 OTHER SPECIFIED POSTPROCEDURAL STATES: Chronic | ICD-10-CM

## 2019-10-16 PROCEDURE — 71250 CT THORAX DX C-: CPT | Mod: 26

## 2019-10-16 PROCEDURE — 71250 CT THORAX DX C-: CPT

## 2020-05-04 ENCOUNTER — OUTPATIENT (OUTPATIENT)
Dept: OUTPATIENT SERVICES | Facility: HOSPITAL | Age: 55
LOS: 1 days | End: 2020-05-04

## 2020-05-04 ENCOUNTER — APPOINTMENT (OUTPATIENT)
Dept: CT IMAGING | Facility: CLINIC | Age: 55
End: 2020-05-04
Payer: MEDICARE

## 2020-05-04 ENCOUNTER — RESULT REVIEW (OUTPATIENT)
Age: 55
End: 2020-05-04

## 2020-05-04 DIAGNOSIS — Z98.890 OTHER SPECIFIED POSTPROCEDURAL STATES: Chronic | ICD-10-CM

## 2020-05-04 PROCEDURE — 71250 CT THORAX DX C-: CPT | Mod: 26

## 2020-05-14 ENCOUNTER — APPOINTMENT (OUTPATIENT)
Dept: THORACIC SURGERY | Facility: CLINIC | Age: 55
End: 2020-05-14
Payer: MEDICARE

## 2020-05-14 PROCEDURE — 99212 OFFICE O/P EST SF 10 MIN: CPT | Mod: 95

## 2020-06-30 NOTE — HISTORY OF PRESENT ILLNESS
[Home] : at home, [unfilled] , at the time of the visit. 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 \par \par When asked if he followed up with Dr. Carmona as recommended, he states that he was not able to follow up with him because of a change in his housing and Dr. Carmona's practice was too far for him to travel to. Per the patient, he did not follow up with another oncologist. \par \par CT chest completed on 05/04/20:\par -no interval change\par -s/p right lower lobe lobectomy\par -no recurrence of tumor

## 2020-06-30 NOTE — ASSESSMENT
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 \par \par CT chest was reviewed and is stable, without evidence of recurrence. Early stage lung cancer, patient did not receive chemo/rt post-operatively. Incisions are still numb as per patient but healed well. Will plan to repeat the CT chest in 1 year and PFT's.\par \par Plan:\par 1. CT chest and PFT's in one year \par \par I, MARLEEN HERRING , am scribing for and in the presence of [Dr. Kenyon Roberts] the following sections: History of present illness, past Medical/family/surgical/family/social history, review of systems, vital signs, physical exam and disposition.\par \par

## 2021-05-10 ENCOUNTER — RESULT REVIEW (OUTPATIENT)
Age: 56
End: 2021-05-10

## 2021-05-13 ENCOUNTER — OUTPATIENT (OUTPATIENT)
Dept: OUTPATIENT SERVICES | Facility: HOSPITAL | Age: 56
LOS: 1 days | End: 2021-05-13
Payer: MEDICARE

## 2021-05-13 ENCOUNTER — APPOINTMENT (OUTPATIENT)
Dept: THORACIC SURGERY | Facility: CLINIC | Age: 56
End: 2021-05-13
Payer: MEDICARE

## 2021-05-13 ENCOUNTER — APPOINTMENT (OUTPATIENT)
Dept: CT IMAGING | Facility: HOSPITAL | Age: 56
End: 2021-05-13

## 2021-05-13 VITALS
WEIGHT: 268 LBS | SYSTOLIC BLOOD PRESSURE: 133 MMHG | HEIGHT: 70 IN | TEMPERATURE: 97 F | HEART RATE: 91 BPM | DIASTOLIC BLOOD PRESSURE: 82 MMHG | RESPIRATION RATE: 18 BRPM | BODY MASS INDEX: 38.37 KG/M2 | OXYGEN SATURATION: 91 %

## 2021-05-13 DIAGNOSIS — Z98.890 OTHER SPECIFIED POSTPROCEDURAL STATES: Chronic | ICD-10-CM

## 2021-05-13 DIAGNOSIS — J43.9 EMPHYSEMA, UNSPECIFIED: ICD-10-CM

## 2021-05-13 DIAGNOSIS — R06.02 SHORTNESS OF BREATH: ICD-10-CM

## 2021-05-13 DIAGNOSIS — Z90.2 ACQUIRED ABSENCE OF LUNG [PART OF]: ICD-10-CM

## 2021-05-13 PROCEDURE — 94729 DIFFUSING CAPACITY: CPT | Mod: 26

## 2021-05-13 PROCEDURE — 71250 CT THORAX DX C-: CPT | Mod: 26,MH

## 2021-05-13 PROCEDURE — 71250 CT THORAX DX C-: CPT

## 2021-05-13 PROCEDURE — 99214 OFFICE O/P EST MOD 30 MIN: CPT

## 2021-05-13 PROCEDURE — 94010 BREATHING CAPACITY TEST: CPT | Mod: 26

## 2021-05-13 PROCEDURE — 94726 PLETHYSMOGRAPHY LUNG VOLUMES: CPT | Mod: 26

## 2021-07-12 ENCOUNTER — APPOINTMENT (OUTPATIENT)
Dept: PULMONOLOGY | Facility: CLINIC | Age: 56
End: 2021-07-12
Payer: MEDICARE

## 2021-07-12 VITALS
HEIGHT: 70 IN | OXYGEN SATURATION: 93 % | WEIGHT: 269 LBS | TEMPERATURE: 97 F | HEART RATE: 108 BPM | RESPIRATION RATE: 12 BRPM | DIASTOLIC BLOOD PRESSURE: 76 MMHG | BODY MASS INDEX: 38.51 KG/M2 | SYSTOLIC BLOOD PRESSURE: 131 MMHG

## 2021-07-12 DIAGNOSIS — Z90.2 ACQUIRED ABSENCE OF LUNG [PART OF]: ICD-10-CM

## 2021-07-12 PROCEDURE — 94664 DEMO&/EVAL PT USE INHALER: CPT

## 2021-07-12 PROCEDURE — 99205 OFFICE O/P NEW HI 60 MIN: CPT | Mod: 25

## 2021-07-12 PROCEDURE — 99407 BEHAV CHNG SMOKING > 10 MIN: CPT

## 2021-07-12 RX ORDER — IPRATROPIUM BROMIDE AND ALBUTEROL SULFATE 2.5; .5 MG/3ML; MG/3ML
0.5-2.5 (3) SOLUTION RESPIRATORY (INHALATION)
Qty: 90 | Refills: 11 | Status: ACTIVE | COMMUNITY
Start: 2021-07-12 | End: 1900-01-01

## 2021-07-12 RX ORDER — SOFT LENS DISINFECTANT
SOLUTION, NON-ORAL MISCELLANEOUS
Qty: 1 | Refills: 0 | Status: ACTIVE | COMMUNITY
Start: 2021-07-12 | End: 1900-01-01

## 2021-07-12 NOTE — ASSESSMENT
[FreeTextEntry1] : COPD\par \par I discussed the case in detail with the patient that I educated him on his condition..  Patient has both emphysema and chronic bronchitis phenotypes.  The CT scan of the chest was consistent with emphysematous changes.  The PFT was consistent with moderate obstructive lung disease with an element of bronchospasm.  Most likely patient has an overlap COPD/asthma syndrome.\par \par The CAT score is 25.  The patient was never hospitalized for his COPD.  Patient is  gold group 2 and group C.\par \par The patient will require mental and therapy.  I started the patient on triple therapy as he has bronchodilator response on the PFT.  I started the patient on Trelegy.  I gave the patient sample and instruction how to use it.  The patient is to continue on albuterol nebulizer and Ventolin as needed basis.  No indication for systemic steroid at this point.  No indication for systemic antibiotic.\par \par We will follow-up in 1 to 2 weeks with a telemedicine.\par \par I ordered the nebulizer machine and the inhalers.\par \par Adenocarcinoma of the right lower lobe status post lobectomy\par \par It is stage pathologically pT!b,No.\par \par Pulmonary nodule\par \par Patient has a new 7 mm nodule in the left upper lobe.  The patient is still smoking the risk for lung cancer.  I discussed with Dr. Roberts regarding repeated the CT scan in 3 months from the original CT scan in May.\par \par Snoring\par \par I discussed the short and long term health effect of the obstructive seep apnea with the patient. These effects include, but not limited to, uncontrolled hypertension, CAD, arrhythmias, sudden death, CVA, and pulmonary hypertension. I advised the patient to avoid sedatives, narcotics, driving, and sleeping pills in the meantime. I discussed the therapeutic options including but not limited to CPAP, surgery, and oral appliance. Further recommendations will follow after sleep study.\par \par Smoke cessation counseling\par discussed in details with smoke cessation  Started the patient on Chantix.  He did not tolerate nicotine patch in the pastThere is also tried Wellbutrin in the past unsuccessfully

## 2021-07-12 NOTE — PROCEDURE
[FreeTextEntry1] : Patient had a PFT which was consistent with moderate obstructive bronchial lung disease with significant response to bronchodilator, normal lung capacity and severe decrease in the diffusion capacity.  Compared to the previous 1 there is decrease in all items over the PFT which was prelobectomy.\par \par CT scan of the chest revealed no evidence of recurrence but he has a new 7 mm nodule in the upper lobe.  Positive for emphysematous changes.\par \par CAT score 25\par \par Peak inspiratory force\par \par With 0 resistance the peak inspiratory force was 120\par \par With level 1 resistance peak flow was 120\par \par With level 2 resistance the peak flow was 100\par \par With level 3 resistance the peak flow was 90\par \par With the 4 level resistance the peak flow was 70 \par \par With level 5 resistance the peak flow was 70

## 2021-07-12 NOTE — REVIEW OF SYSTEMS
[Cough] : cough [Chest Tightness] : chest tightness [Sputum] : sputum [Wheezing] : wheezing [SOB on Exertion] : sob on exertion [Negative] : Endocrine [Hemoptysis] : no hemoptysis [Frequent URIs] : no frequent URIs [Dyspnea] : no dyspnea [Pleuritic Pain] : no pleuritic pain [A.M. Dry Mouth] : no a.m. dry mouth

## 2021-07-12 NOTE — HISTORY OF PRESENT ILLNESS
[Current] : current [>= 30 pack years] : >= 30 pack years [TextBox_4] : The patient is complaining that he has productive cough of white sputum every morning.  He is also still smoking but he wants to try to quit smoking.  He was given something in the past and basically was not covered by the insurance now he is back to smoking.  He is short of breath when he exerts himself.  He occasionally wheeze.  He lost his nebulizer machine and he wants to get a new one.  He is on the Ventolin to use it sometimes nothing sometimes more than twice a day depends on the weather and and his exertion.He stated that he snores a lot and he is of [place] he has to close the topic he snores badly he wakes up tired and [TextBox_11] : 1 [TextBox_13] : 40

## 2021-07-13 ENCOUNTER — NON-APPOINTMENT (OUTPATIENT)
Age: 56
End: 2021-07-13

## 2021-07-16 NOTE — ASSESSMENT
[FreeTextEntry1] : 54 y/o male, current smoker, with a PMH of DM, COPD, HLD, chronic lower back pain, s/p Robotic VATS R lower lobectomy with lymph node dissection for a RIGHT lower lobe pT1b, pN0 Stage IA adenocarcinoma on 2/22/2017, presents today for follow up with a CT chest and PFT's. \par \par Patient reports chronic SOB with exertion, still smoking about 1/2 to 3/4 pack of cigarettes a day. Denies recent hospitalizations, fever/chills. He is vaccinated. \par \par CT Chest 5/4/21 reviewed and showed no radiographic evidence of recurrent disease. \par \par PFTs performed today on 5/14/21 revealed progressive emphysema. Will refer patient to Dr. Arroyo for further management. Advised patient to quit smoking. \par \par Patient has a history of chronic lower back pain, managed by Dr. Cristian Garnett. He is treated with monthly epidural injections and Endocet. However, he is unable to lie flat without coughing and inducing right sided chest wall discomfort around surgery site. Without the injections, Dr Garnett will not write a rx for Endocet and patient is requesting a letter from us to stop back injections. Advised patient to speak to his doctor and/or seek a 2nd opinion. \par \par I have reviewed the patient's medical records and diagnostic images at the time of this office consultation and have made the following recommendation.\par Plan:\par 1. CT Chest in 1 year\par 2. Smoking cessation\par 3. Referral to Dr. Arroyo \par \par no evidence of recurrent lung cancer\par \par I reviewed the documentation recorded by the scribe in my presence and it accurately and completely records my words and actions\par \par

## 2021-07-16 NOTE — HISTORY OF PRESENT ILLNESS
[FreeTextEntry1] : 54 y/o male, current smoker, with a PMH of DM, COPD, HLD, chronic lower back pain, s/p Robotic VATS R lower lobectomy with lymph node dissection for a RIGHT lower lobe pT1b, pN0 Stage IA adenocarcinoma on 2/22/2017, presents today for follow up with a CT chest and PFT's. \par \par CT chest completed on 05/04/20:\par -no interval change\par -s/p right lower lobe lobectomy\par -no recurrence of tumor \par \par CT chest completed on 05/04/21:\par -no interval change. S/p right lower lobe lobectomy. No recurrence of tumor. \par \par PFT done on 05/13/21 showed FEV1 = 1.91, 53% of predicted value, FVC = 3.62, 79% of predicted value,  DLCO = 10.65, 39% of predicted value.\par

## 2021-07-16 NOTE — END OF VISIT
[FreeTextEntry3] : I, MELL OCHOA , am scribing for and in the presence of COLIN LI the following sections: history of present illness, past medical/family/surgical/family/social history, review of systems, vital signs, physical exam, and disposition.\par

## 2021-07-16 NOTE — ADDENDUM
[FreeTextEntry1] : CT chest completed on 05/13/21 was reviewed by Dr. Roberts and in favor of a repeat scan in August.

## 2021-07-26 ENCOUNTER — APPOINTMENT (OUTPATIENT)
Dept: PULMONOLOGY | Facility: CLINIC | Age: 56
End: 2021-07-26
Payer: MEDICARE

## 2021-07-26 PROCEDURE — 99214 OFFICE O/P EST MOD 30 MIN: CPT | Mod: 95

## 2021-07-26 RX ORDER — FLUTICASONE FUROATE, UMECLIDINIUM BROMIDE AND VILANTEROL TRIFENATATE 100; 62.5; 25 UG/1; UG/1; UG/1
100-62.5-25 POWDER RESPIRATORY (INHALATION) DAILY
Qty: 1 | Refills: 11 | Status: ACTIVE | COMMUNITY
Start: 2021-07-26 | End: 1900-01-01

## 2021-07-26 NOTE — ASSESSMENT
[FreeTextEntry1] : COPD\par \par The patient is clinically stable and improved on the triple therapy bronchodilator.  These patient used the short acting beta agonist only 3 times since the he saw me.  His exercise capacity improved and his symptoms decrease.  Patient is to continue on her current regimen and will follow clinically in 2 months.\par \par Pulmonary nodule the patient is scheduled for repeat CT scan of the chest and he has an appointment with Dr. Roberts.

## 2021-07-26 NOTE — REASON FOR VISIT
[Home] : at home, [unfilled] , at the time of the visit. [Medical Office: ([facility])___] : at the medical office located in  [Follow-Up] : a follow-up visit

## 2021-07-26 NOTE — HISTORY OF PRESENT ILLNESS
[Current] : current [Never] : never [TextBox_4] : he is better and using the Trelegy and used the pump three times since her saw me. He is less sob.  he is able to walk more.  The cough is not as bad it used to be.  The sputum is also less

## 2021-07-28 RX ORDER — VARENICLINE TARTRATE 0.5 (11)-1
0.5 MG X 11 & KIT ORAL
Qty: 1 | Refills: 0 | Status: DISCONTINUED | COMMUNITY
Start: 2021-07-12 | End: 2021-07-28

## 2021-07-28 RX ORDER — BUPROPION HYDROCHLORIDE 150 MG/1
150 TABLET, FILM COATED, EXTENDED RELEASE ORAL TWICE DAILY
Qty: 60 | Refills: 0 | Status: ACTIVE | COMMUNITY
Start: 2021-07-28 | End: 1900-01-01

## 2021-08-05 ENCOUNTER — APPOINTMENT (OUTPATIENT)
Dept: THORACIC SURGERY | Facility: CLINIC | Age: 56
End: 2021-08-05
Payer: MEDICARE

## 2021-08-05 ENCOUNTER — OUTPATIENT (OUTPATIENT)
Dept: OUTPATIENT SERVICES | Facility: HOSPITAL | Age: 56
LOS: 1 days | End: 2021-08-05
Payer: MEDICARE

## 2021-08-05 ENCOUNTER — APPOINTMENT (OUTPATIENT)
Dept: CT IMAGING | Facility: HOSPITAL | Age: 56
End: 2021-08-05

## 2021-08-05 VITALS
RESPIRATION RATE: 17 BRPM | SYSTOLIC BLOOD PRESSURE: 119 MMHG | HEIGHT: 70 IN | BODY MASS INDEX: 38.51 KG/M2 | TEMPERATURE: 97.7 F | OXYGEN SATURATION: 91 % | WEIGHT: 269 LBS | HEART RATE: 86 BPM | DIASTOLIC BLOOD PRESSURE: 71 MMHG

## 2021-08-05 DIAGNOSIS — Z98.890 OTHER SPECIFIED POSTPROCEDURAL STATES: Chronic | ICD-10-CM

## 2021-08-05 DIAGNOSIS — R91.1 SOLITARY PULMONARY NODULE: ICD-10-CM

## 2021-08-05 PROCEDURE — G1004: CPT

## 2021-08-05 PROCEDURE — 71250 CT THORAX DX C-: CPT | Mod: MG

## 2021-08-05 PROCEDURE — 99214 OFFICE O/P EST MOD 30 MIN: CPT

## 2021-08-05 PROCEDURE — 71250 CT THORAX DX C-: CPT | Mod: 26,MG

## 2021-08-11 NOTE — ASSESSMENT
[FreeTextEntry1] : 54 y/o male, current smoker, with a PMH of DM, COPD, HLD, chronic lower back pain, s/p Robotic VATS R lower lobectomy with lymph node dissection for a RIGHT lower lobe pT1b, pN0 Stage IA adenocarcinoma on 2/22/2017, presents today for follow up with a CT chest. \par \par CT chest was reviewed and compared to previous imaging which reveals stability of the Left upper lobe nodule. I explained that I do feel that it appears less dense than previous scan. Will plan to discuss at Thoracic  tumor board and determine interval surveillance vs. biopsy. I explained that due to his emphysema there are risks involved with treatment and biopsy. Will plan for a repeat CT chest in three months due to stability. \par \par Will call the patient next Thursday to discuss the consensus opinion of tumor board.\par \par Plan:\par 1. Thoracic tumor board next week 08/12/21\par 2. CT chest in three months \par 3. Call patient next week with consensus opinion\par \par I, MARLEEN HERRING , am scribing for and in the presence of [Dr. Kenyon Roberts] the following sections: History of present illness, past Medical/family/surgical/family/social history, review of systems, vital signs, physical exam and disposition.\par \par I reviewed the documentation recorded by the scribe in my presence and it accurately and completely records my words and actions\par \par \par

## 2021-08-11 NOTE — PHYSICAL EXAM
[] : no respiratory distress [Respiration, Rhythm And Depth] : normal respiratory rhythm and effort [Exaggerated Use Of Accessory Muscles For Inspiration] : no accessory muscle use [Auscultation Breath Sounds / Voice Sounds] : lungs were clear to auscultation bilaterally [Apical Impulse] : the apical impulse was normal [Heart Rate And Rhythm] : heart rate was normal and rhythm regular [Heart Sounds] : normal S1 and S2 [Examination Of The Chest] : the chest was normal in appearance [2+] : left 2+ [FreeTextEntry1] : uses a cane

## 2021-08-11 NOTE — HISTORY OF PRESENT ILLNESS
[FreeTextEntry1] : 56 y/o male, current smoker, with a PMH of DM, COPD, HLD, chronic lower back pain, s/p Robotic VATS R lower lobectomy with lymph node dissection for a RIGHT lower lobe pT1b, pN0 Stage IA adenocarcinoma on 2/22/2017, presents today for follow up with a CT chest. \par \par CT chest completed on 05/04/20:\par -no interval change\par -s/p right lower lobe lobectomy\par -no recurrence of tumor \par \par \par PFT done on 05/13/21 showed FEV1 = 1.91, 53% of predicted value, FVC = 3.62, 79% of predicted value, DLCO = 10.65, 39% of predicted value.\par \par CT chest completed on 05/13/21:\par -s/p right lower lobe lobectomy without evidence of local disease recurrence\par -new 7.4mm linear nodule within the anterior aspect of the apical posterior segment of the left upper lobe. \par -confluent centrilobular emphysema\par -persistent mild dilation of the main pulmonary artery measuring 3.4cm. Mild pulmonary artery hypertension cannot be excluded \par \par CT chest completed on 08/05/21:\par 
No

## 2021-09-02 ENCOUNTER — OUTPATIENT (OUTPATIENT)
Dept: OUTPATIENT SERVICES | Facility: HOSPITAL | Age: 56
LOS: 1 days | End: 2021-09-02
Payer: MEDICARE

## 2021-09-02 ENCOUNTER — APPOINTMENT (OUTPATIENT)
Dept: PULMONOLOGY | Facility: CLINIC | Age: 56
End: 2021-09-02
Payer: MEDICARE

## 2021-09-02 ENCOUNTER — APPOINTMENT (OUTPATIENT)
Dept: SLEEP CENTER | Facility: HOSPITAL | Age: 56
End: 2021-09-02

## 2021-09-02 VITALS
SYSTOLIC BLOOD PRESSURE: 133 MMHG | BODY MASS INDEX: 37.8 KG/M2 | HEART RATE: 95 BPM | TEMPERATURE: 93.7 F | DIASTOLIC BLOOD PRESSURE: 79 MMHG | HEIGHT: 70 IN | WEIGHT: 264 LBS | OXYGEN SATURATION: 94 %

## 2021-09-02 DIAGNOSIS — R06.83 SNORING: ICD-10-CM

## 2021-09-02 DIAGNOSIS — Z98.890 OTHER SPECIFIED POSTPROCEDURAL STATES: Chronic | ICD-10-CM

## 2021-09-02 DIAGNOSIS — C34.31 MALIGNANT NEOPLASM OF LOWER LOBE, RIGHT BRONCHUS OR LUNG: ICD-10-CM

## 2021-09-02 DIAGNOSIS — G47.33 OBSTRUCTIVE SLEEP APNEA (ADULT) (PEDIATRIC): ICD-10-CM

## 2021-09-02 DIAGNOSIS — J43.9 EMPHYSEMA, UNSPECIFIED: ICD-10-CM

## 2021-09-02 PROCEDURE — 95810 POLYSOM 6/> YRS 4/> PARAM: CPT | Mod: 26

## 2021-09-02 PROCEDURE — 99213 OFFICE O/P EST LOW 20 MIN: CPT

## 2021-09-02 PROCEDURE — 95810 POLYSOM 6/> YRS 4/> PARAM: CPT

## 2021-09-02 NOTE — ASSESSMENT
[FreeTextEntry1] : 56 y/o male, current smoker, with a PMH of DM, COPD, HLD, chronic lower back pain, s/p Robotic VATS R lower lobectomy with lymph node dissection for R lower lobe pT1b, pN0 Stage IA adenocarcinoma on 2/22/2017 presenting for follow up of the following:\par \par #COPD\par Overall feeling improved with Trelegy use--increased walking distance, breathing improved, less cough and phlegm. He has not required prn albuterol. Has not been hospitalized in the past year for COPD and doing well. No fevers, chills. Lung exam is clear with good air movement and no wheeze. \par -sample given for trelegy\par -continue to use Trelegy\par -prn albuterol\par -smoking cessation\par \par #pulmonary nodules\par L upper lobe nodule discussed at thoracic tumor board --consensus was to follow up in 3 months for repeat imaging. Denies night sweats or weight loss.  \par -CT pending for Nov 2021\par \par #current smoker\par He is currently smoking less than half a pack per day. He has not started to use Wellbutrin that has been prescribed. \par -encourage cessation and start Wellbutrin \par \par #snoring/concern for sleep apnea\par He is still having intermittent wakening. Scheduled for in lab sleep study tonight. Will call with results. \par -follow up sleep study results \par \par Follow up in clinic in Nov on same day he sees Dr. Roberts in November. \par

## 2021-09-02 NOTE — HISTORY OF PRESENT ILLNESS
[Former] : former [Never] : never [TextBox_4] : heis upset that he had to spend 6 hours traveling.  The medicine is working well and the inhaler worked well.  He is breathing better.  He is coughing and sputum is small in amount and improved after the medicine.  He is using the albuterol less.He is walking better and further than usual.  He is scheduled for the sleep study.  He is sleeping well but wakes uo several times during the night.  He is following with Dr Roberts.  He is still smoking less than half a pack a day

## 2021-10-28 ENCOUNTER — RESULT REVIEW (OUTPATIENT)
Age: 56
End: 2021-10-28

## 2021-10-28 ENCOUNTER — OUTPATIENT (OUTPATIENT)
Dept: OUTPATIENT SERVICES | Facility: HOSPITAL | Age: 56
LOS: 1 days | End: 2021-10-28
Payer: MEDICARE

## 2021-10-28 ENCOUNTER — APPOINTMENT (OUTPATIENT)
Dept: CT IMAGING | Facility: HOSPITAL | Age: 56
End: 2021-10-28

## 2021-10-28 DIAGNOSIS — Z98.890 OTHER SPECIFIED POSTPROCEDURAL STATES: Chronic | ICD-10-CM

## 2021-10-28 PROCEDURE — 71250 CT THORAX DX C-: CPT | Mod: MH

## 2021-10-28 PROCEDURE — 71250 CT THORAX DX C-: CPT | Mod: 26,MH

## 2021-11-03 ENCOUNTER — NON-APPOINTMENT (OUTPATIENT)
Age: 56
End: 2021-11-03

## 2021-11-03 DIAGNOSIS — R91.1 SOLITARY PULMONARY NODULE: ICD-10-CM

## 2022-01-25 ENCOUNTER — OUTPATIENT (OUTPATIENT)
Dept: OUTPATIENT SERVICES | Facility: HOSPITAL | Age: 57
LOS: 1 days | End: 2022-01-25
Payer: MEDICARE

## 2022-01-25 ENCOUNTER — APPOINTMENT (OUTPATIENT)
Dept: CT IMAGING | Facility: HOSPITAL | Age: 57
End: 2022-01-25
Payer: MEDICARE

## 2022-01-25 DIAGNOSIS — Z98.890 OTHER SPECIFIED POSTPROCEDURAL STATES: Chronic | ICD-10-CM

## 2022-01-25 PROCEDURE — G1004: CPT

## 2022-01-25 PROCEDURE — 71250 CT THORAX DX C-: CPT | Mod: MG

## 2022-01-25 PROCEDURE — 71250 CT THORAX DX C-: CPT | Mod: 26,MG

## 2022-02-01 ENCOUNTER — NON-APPOINTMENT (OUTPATIENT)
Age: 57
End: 2022-02-01

## 2022-05-05 NOTE — PATIENT PROFILE ADULT. - FUNCTIONAL SCREEN CURRENT LEVEL: SWALLOWING (IF SCORE 2 OR MORE FOR ANY ITEM, CONSULT REHAB SERVICES), MLM)
2 = difficulty speaking (not related to language barrier)
(0) swallows foods/liquids without difficulty

## 2022-07-07 ENCOUNTER — APPOINTMENT (OUTPATIENT)
Dept: CT IMAGING | Facility: HOSPITAL | Age: 57
End: 2022-07-07

## 2022-07-07 ENCOUNTER — OUTPATIENT (OUTPATIENT)
Dept: OUTPATIENT SERVICES | Facility: HOSPITAL | Age: 57
LOS: 1 days | End: 2022-07-07
Payer: MEDICARE

## 2022-07-07 DIAGNOSIS — Z98.890 OTHER SPECIFIED POSTPROCEDURAL STATES: Chronic | ICD-10-CM

## 2022-07-07 PROCEDURE — 71250 CT THORAX DX C-: CPT | Mod: 26

## 2022-07-07 PROCEDURE — 71250 CT THORAX DX C-: CPT

## 2022-07-12 ENCOUNTER — APPOINTMENT (OUTPATIENT)
Dept: THORACIC SURGERY | Facility: CLINIC | Age: 57
End: 2022-07-12

## 2022-07-12 DIAGNOSIS — Z08 ENCOUNTER FOR FOLLOW-UP EXAMINATION AFTER COMPLETED TREATMENT FOR MALIGNANT NEOPLASM: ICD-10-CM

## 2022-07-12 DIAGNOSIS — Z85.118 ENCOUNTER FOR FOLLOW-UP EXAMINATION AFTER COMPLETED TREATMENT FOR MALIGNANT NEOPLASM: ICD-10-CM

## 2022-07-12 PROCEDURE — 99441: CPT

## 2022-07-12 NOTE — ASSESSMENT
[FreeTextEntry1] : 57 y/o male, current smoker, with a PMH of DM, COPD, HLD, chronic lower back pain, s/p Robotic VATS R lower lobectomy with lymph node dissection for a RIGHT lower lobe pT1b, pN0 Stage IA adenocarcinoma on 2/22/2017, presents today for follow up with a CT chest. \par \par CT chest completed on 06/24/22:\par -no new suspicious finding. \par \par Patient denies cough, hemoptysis, shortness of breath, weight change, nausea, vomiting, diarrhea, chest pain, fever, or any recent illnesses or hospitalizations. \par \par CT chest was reviewed and stable. WIll plan for a repeat CT chest in one year. Patient verbally understood the plan of care. \par \par Plan:\par 1. CT chest in one year

## 2022-07-12 NOTE — HISTORY OF PRESENT ILLNESS
[Home] : at home, [unfilled] , at the time of the visit. [Medical Office: ([facility])___] : at the medical office located in  [Verbal consent obtained from patient] : the patient, [unfilled] [FreeTextEntry1] : 55 y/o male, current smoker, with a PMH of DM, COPD, HLD, chronic lower back pain, s/p Robotic VATS R lower lobectomy with lymph node dissection for a RIGHT lower lobe pT1b, pN0 Stage IA adenocarcinoma on 2/22/2017, presents today for follow up with a CT chest. \par \par CT chest completed on 06/24/22:\par -no new suspicious finding.

## 2022-08-17 RX ORDER — ALBUTEROL SULFATE 90 UG/1
108 (90 BASE) AEROSOL, METERED RESPIRATORY (INHALATION)
Qty: 3 | Refills: 3 | Status: ACTIVE | COMMUNITY
Start: 2021-07-12 | End: 1900-01-01

## 2023-07-17 ENCOUNTER — RESULT REVIEW (OUTPATIENT)
Age: 58
End: 2023-07-17

## 2023-07-17 ENCOUNTER — APPOINTMENT (OUTPATIENT)
Dept: CT IMAGING | Facility: HOSPITAL | Age: 58
End: 2023-07-17

## 2023-07-17 ENCOUNTER — OUTPATIENT (OUTPATIENT)
Dept: OUTPATIENT SERVICES | Facility: HOSPITAL | Age: 58
LOS: 1 days | End: 2023-07-17
Payer: MEDICARE

## 2023-07-17 DIAGNOSIS — Z98.890 OTHER SPECIFIED POSTPROCEDURAL STATES: Chronic | ICD-10-CM

## 2023-07-17 PROCEDURE — 71250 CT THORAX DX C-: CPT

## 2023-07-17 PROCEDURE — 71250 CT THORAX DX C-: CPT | Mod: 26

## 2023-07-27 ENCOUNTER — NON-APPOINTMENT (OUTPATIENT)
Age: 58
End: 2023-07-27

## 2023-07-27 ENCOUNTER — APPOINTMENT (OUTPATIENT)
Dept: THORACIC SURGERY | Facility: CLINIC | Age: 58
End: 2023-07-27
Payer: MEDICARE

## 2023-07-27 PROCEDURE — 99442: CPT | Mod: 95

## 2023-07-27 NOTE — ASSESSMENT
[FreeTextEntry1] : 56 y/o male, current smoker, with a PMH of DM, COPD, HLD, chronic lower back pain, s/p Robotic VATS R lower lobectomy with lymph node dissection for a RIGHT lower lobe pT1b, pN0 Stage IA adenocarcinoma on 2/22/2017, now 5 years post op. He presents today for follow up with a one year CT chest. \par \par CT chest 7/17/23:\par 1. No significant change since 7/7/2022.\par 2. Status post right lower lobe lobectomy without evidence for local recurrent disease.\par 3. Severe emphysema unchanged prior imaging as described above.\par 4. Moderate unchanged dilatation of the main pulmonary artery measuring 3.8 cm. Pulmonary hypertension cannot be excluded. \par \par Patient is doing well. He has no complaints. The chest CT was reviewed which shows no evidence of recurrence. He will followup with his pulmonologist for his dilated main PA and possible pulmonary hypertension. We will see him in one year with a chest CT.\par \par Plan:\par 1. Chest CT 1 year\par 2. Followup with pulmonology\par \par IPurnima MD personally performed the services described in the documentation, reviewed the documentation recorded by the scribe in my presence and it accurately and completely records my words and actions. I have personally seen, examined, and participated in the care of this patient.  I have reviewed all pertinent clinical information, including history and physical exam and plan.  I agree with the above history, physical and plan of the ACP which I have reviewed and edited where appropriate.\par \par Total time of 11 minutes with > 50% spent with the patient discussing radiologic studies and need for surveillance.\par

## 2023-07-27 NOTE — HISTORY OF PRESENT ILLNESS
[FreeTextEntry1] : 56 y/o male, current smoker, with a PMH of DM, COPD, HLD, chronic lower back pain, s/p Robotic VATS R lower lobectomy with lymph node dissection for a RIGHT lower lobe pT1b, pN0 Stage IA adenocarcinoma on 2/22/2017, now 5 years post op. He presents today for follow up with a one year CT chest. \par \par CT chest 7/17/23:\par 1.  No significant change since 7/7/2022.\par 2.  Status post right lower lobe lobectomy without evidence for local recurrent disease.\par 3.  Severe emphysema unchanged prior imaging as described above.\par 4.  Moderate unchanged dilatation of the main pulmonary artery measuring 3.8 cm. Pulmonary hypertension cannot be excluded.

## 2024-06-04 NOTE — BRIEF OPERATIVE NOTE - BRIEF OP NOTE DRAINS
Detail Level: Zone Detail Level: Generalized Detail Level: Detailed 1 right chest tube Airway patent, Nasal mucosa clear. Mouth with normal mucosa. Throat has no vesicles, no oropharyngeal exudates and uvula is midline.

## 2024-08-08 ENCOUNTER — APPOINTMENT (OUTPATIENT)
Dept: CT IMAGING | Facility: HOSPITAL | Age: 59
End: 2024-08-08

## 2024-08-08 ENCOUNTER — OUTPATIENT (OUTPATIENT)
Dept: OUTPATIENT SERVICES | Facility: HOSPITAL | Age: 59
LOS: 1 days | End: 2024-08-08
Payer: MEDICARE

## 2024-08-08 DIAGNOSIS — Z98.890 OTHER SPECIFIED POSTPROCEDURAL STATES: Chronic | ICD-10-CM

## 2024-08-08 PROCEDURE — 71250 CT THORAX DX C-: CPT | Mod: 26

## 2024-08-08 PROCEDURE — 71250 CT THORAX DX C-: CPT

## 2024-08-14 ENCOUNTER — NON-APPOINTMENT (OUTPATIENT)
Age: 59
End: 2024-08-14

## 2024-08-15 ENCOUNTER — APPOINTMENT (OUTPATIENT)
Dept: THORACIC SURGERY | Facility: CLINIC | Age: 59
End: 2024-08-15
Payer: MEDICARE

## 2024-08-15 DIAGNOSIS — Z85.118 ENCOUNTER FOR FOLLOW-UP EXAMINATION AFTER COMPLETED TREATMENT FOR MALIGNANT NEOPLASM: ICD-10-CM

## 2024-08-15 DIAGNOSIS — Z08 ENCOUNTER FOR FOLLOW-UP EXAMINATION AFTER COMPLETED TREATMENT FOR MALIGNANT NEOPLASM: ICD-10-CM

## 2024-08-15 PROCEDURE — 99202 OFFICE O/P NEW SF 15 MIN: CPT

## 2024-08-15 NOTE — DATA REVIEWED
[FreeTextEntry1] : CT chest 7/17/23:  1. No significant change since 7/7/2022.  2. Status post right lower lobe lobectomy without evidence for local recurrent disease.  3. Severe emphysema unchanged prior imaging as described above.  4. Moderate unchanged dilatation of the main pulmonary artery measuring 3.8 cm. Pulmonary hypertension cannot be excluded.
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
## 2024-08-30 ENCOUNTER — APPOINTMENT (OUTPATIENT)
Dept: PULMONOLOGY | Facility: CLINIC | Age: 59
End: 2024-08-30
Payer: MEDICARE

## 2024-08-30 VITALS
BODY MASS INDEX: 35.07 KG/M2 | OXYGEN SATURATION: 90 % | HEIGHT: 70 IN | WEIGHT: 245 LBS | HEART RATE: 106 BPM | TEMPERATURE: 98.1 F | SYSTOLIC BLOOD PRESSURE: 113 MMHG | DIASTOLIC BLOOD PRESSURE: 73 MMHG

## 2024-08-30 DIAGNOSIS — J43.9 EMPHYSEMA, UNSPECIFIED: ICD-10-CM

## 2024-08-30 PROCEDURE — 99214 OFFICE O/P EST MOD 30 MIN: CPT

## 2024-08-30 RX ORDER — AZITHROMYCIN 250 MG/1
250 TABLET, FILM COATED ORAL
Qty: 6 | Refills: 0 | Status: ACTIVE | COMMUNITY
Start: 2024-08-30 | End: 1900-01-01

## 2024-08-30 RX ORDER — PREDNISONE 20 MG/1
20 TABLET ORAL DAILY
Qty: 10 | Refills: 0 | Status: ACTIVE | COMMUNITY
Start: 2024-08-30 | End: 1900-01-01

## 2024-08-31 LAB
APTT BLD: 33.1 SEC
BASOPHILS # BLD AUTO: 0.05 K/UL
BASOPHILS NFR BLD AUTO: 0.4 %
EOSINOPHIL # BLD AUTO: 0.1 K/UL
EOSINOPHIL NFR BLD AUTO: 0.7 %
HCT VFR BLD CALC: 54.9 %
HGB BLD-MCNC: 17.6 G/DL
IMM GRANULOCYTES NFR BLD AUTO: 0.4 %
INR PPP: 0.87 RATIO
LYMPHOCYTES # BLD AUTO: 2.35 K/UL
LYMPHOCYTES NFR BLD AUTO: 17.6 %
MAN DIFF?: NORMAL
MCHC RBC-ENTMCNC: 29.6 PG
MCHC RBC-ENTMCNC: 32.1 GM/DL
MCV RBC AUTO: 92.4 FL
MONOCYTES # BLD AUTO: 1.3 K/UL
MONOCYTES NFR BLD AUTO: 9.7 %
NEUTROPHILS # BLD AUTO: 9.53 K/UL
NEUTROPHILS NFR BLD AUTO: 71.2 %
PLATELET # BLD AUTO: 323 K/UL
PT BLD: 10 SEC
RBC # BLD: 5.94 M/UL
RBC # FLD: 15.7 %
WBC # FLD AUTO: 13.39 K/UL

## 2024-08-31 NOTE — HISTORY OF PRESENT ILLNESS
[Never] : never [Current] : current [TextBox_4] : I saw Mr. Jb Fuentes today in follow-up for his COPD, NSCLC s/p RLLobectomy (2017), and tobacco use. In review, Mr. Triana is a 59 yo M COPD with emphysema, NSCLC s/p RLLobectomy in 2017), Pre-DM, mild ROCIO, and tobacco use. Mr. Fuentes was previously cared for by Dr. Arroyo with last visit in 9/2021.  More recently, Mr. Fuentes had a follow-up CT and visit with Dr. Ellington that identified occlusion of the RML bronchus. He is here today for follow-up pulmonary evaluation.  Today, Mr. Fuentes reports feeling tired. He has difficulty traveling for his visits here from [place]. Mr. Fuentes reports having a local pulmonologist in [place], though he does not remember their name. On his most recent visit, supplemental oxygen was recommended, though he did not obtain it due to the cost. He is not longer using Trelegy; he was switched to Breztri. He is tolerating it well, though he developed a rash in an intertriginous area. ROCIO therapy was previously discussed, though he declined.  Over the last 3 months, Mr. Fuentes has had worsened cough with sputum production that makes sleep difficult. The cough occurs during the day and night. His sputum is generally yellow, though he has had 1-2 episodes of blood-streaked sputum. He denies weight change, fever, or chills. He is using the Albuterol infrequently. He has a nebulizer at home, though he also uses it infrequently. He endorses exertional dyspnea.   Mr. Fuentes is smoking cigarettes at 1 pack/day.  PMH: COPD Emphysema NSCLC s/p RLLobectomy (2017) Mild ROCIO  Pre-DM Obesity Chronic low back pain HLD  PSH: RLLobectomy  SH: Mr. Fuentes previously worked at [employer] in [employer]. He currently lives in [place].

## 2024-08-31 NOTE — PHYSICAL EXAM
[No Acute Distress] : no acute distress [Normal Appearance] : normal appearance [Normal Rate/Rhythm] : normal rate/rhythm [Normal S1, S2] : normal s1, s2 [No Murmurs] : no murmurs [No Resp Distress] : no resp distress [No Abnormalities] : no abnormalities [Benign] : benign [Normal Gait] : normal gait [No Clubbing] : no clubbing [No Cyanosis] : no cyanosis [No Edema] : no edema [FROM] : FROM [Normal Color/ Pigmentation] : normal color/ pigmentation [No Focal Deficits] : no focal deficits [Oriented x3] : oriented x3 [Normal Affect] : normal affect [TextBox_11] : NC/AT [TextBox_68] : Distant but equal breath sounds throughout. Diffuse inspiratory rhonchi that improved after coughing. No wheezing or rales.

## 2024-08-31 NOTE — ASSESSMENT
[FreeTextEntry1] : Labs:  None recent  Surgical Final Report (2017) Final Diagnosis 1. Lymph node, level 11, excision: - One lymph node negative for tumor (0/1).  2. Lymph node, level 11, excision: - One lymph node negative for tumor (0/1).  3. Lung, right lower lobe, lobectomy: - Adenocarcinoma with neuroendocrine features, solid pattern predominant with acinar and focal micropapillary growth.  See note.  See synoptic summary. - 10 lymph nodes negative for tumor (0/10). - Respiratory bronchiolitis.  4. Lymph node, level 10, excision: - One lymph node negative for tumor (0/1).  5. Lymph node, level 10, excision: - One lymph node negative for tumor (0/1).  6. Lymph node, level 11, excision: - One lymph node negative for tumor (0/1).  7. Lymph node, subcarinal, excision: - One node negative for tumor (0/1).  8. Superior mediastinal lymph node dissection: - Six lymph nodes negative for tumor (0/6).  Note: Sections show a poorly differentiated adenocarcinoma with predominately solid growth with foci of acinar and focal micropapillary growth.  Spread of tumor cells through alveolar spaces is noted.  Immunohistochemistry shows the tumor cells are positive for TTF-1, Napsin, synaptophysin and are negative for p40 and chromogranin.  Pathologic Staging (pTNM):  pT1b, pN0 [Stage IA] Additional Pathologic Findings: Respiratory bronchiolitis  Chest CT (8/2024): IMPRESSION:  1.	Since 7/17/2023, there is now occlusion of the bronchus intermedius and partial occlusion of the right middle lobe bronchus, resulting in atelectasis of a moderate sized portion of the right middle lobe. While there could be mucus impaction, the possibility of recurrent malignancy should be excluded. Bronchoscopy should be considered. 2. No change in a few solid micronodules in both lungs.  PFTs             2021 FEV1/FVC    53% FEV1            1.91, 53% FVC              3.62, 79% TLC              6.14, 89% RV                2.77, 121% DLCO           10.65, 39% -2021: Positive bronchodilator response  In clinic walk test: 8/30/24): Mr. Gilbert walked ~200 feet and SpO2 fell to 86%  Sleep study (2021): AHI AASM: 10.9 AHI CMS: 8.5 spO2 tina 80% Time <88%: 47 minutes  A/P: 57 yo M h/o COPD with emphysema, tobacco use, and NSCLC returns in follow-up.  Mr. Fuentes feels worse today than at last visit. He has increased cough, dyspnea, sputum production, and fatigue. For his lung imaging, we reviewed that he has occlusion of the BI and RML, for which DDx includes endobronchial occlusion from mucus or tumor. Given his increased sputum production and rhonchi, mucus seems most likely. He will need a bronchoscopy. However, given his dyspnea and potentially enlarged PA on chest CT, we will need to update his cardiac and pulmonary evaluations. We will obtain CBC, CMP, and coagulation factors today in anticipation of his bronchoscopy.  We had a long discussion about his pulmonary care. Though he has difficulty with the long travel, he would like to continue his pulmonary care in our clinic. For his increased sputum production and dyspnea, we will treat for a COPD exacerbation with Azithromycin and Prednisone x5 days. He should continue Breztri.  For his COPD care, we discussed the benefits of total tobacco smoking cessation. He also qualifies for supplemental oxygen. He had difficulty affording oxygen with his prior pulmonologist. Given the inability to provide oxygen today, I offered hospitalization while we arrange supplemental oxygen. He declined. I provided him with a finger SpO2 monitor, and we discussed regular monitoring and frequent breaks during exertion.  For his mucus production, we discussed airway clearance. In the past, nebulized Albuterol resulted in abundant mucus production. We discussed BID nebulized Albuterol, vibratory PEP, and incentive spirometry.  For his prior mild ROCIO, we discussed the benefits of CPAP use. He declines additional evaluation or treatment of his CPAP now.  1. COPD: moderate per GOLD; combined assessment category E 2. Emphysema 3. COPD exacerbation 4. Exertional hypoxemia 5. RML atelectasis 6. Tobacco use 7. h/o NSCLC  -Prednisone 40 mg PO daily x5 days -Azithromycin 500 mg PO x1 day, then 250 mg PO daily x4 days -order supplemental oxygen 2L with exertion and sleep; finger SpO2 monitor provided -recommended total smoking cessation -continue LABA/LAMA/ICS and PRN Albuterol; he is rinsing his mouth out after Breztri use -refilled nebulized Albuterol -ACT: nebulized Albuterol BID followed by incentive spirometry and vibratory PEP ([employer] provided) -TTE ASAP -will need Cardiology evaluation -CBC, CMP, coagulation factors -Vaccinations: not discussed today -Future considerations: Pulmonary rehabilitation -follow-up with me in 3-4 weeks

## 2024-08-31 NOTE — REASON FOR VISIT
[Follow-Up] : a follow-up visit [Lung Cancer] : lung cancer [COPD] : COPD [Nicotine Dependence] : nicotine dependence

## 2024-09-11 ENCOUNTER — APPOINTMENT (OUTPATIENT)
Dept: HEART AND VASCULAR | Facility: CLINIC | Age: 59
End: 2024-09-11
Payer: MEDICARE

## 2024-09-11 ENCOUNTER — APPOINTMENT (OUTPATIENT)
Dept: PULMONOLOGY | Facility: CLINIC | Age: 59
End: 2024-09-11
Payer: MEDICARE

## 2024-09-11 ENCOUNTER — RESULT REVIEW (OUTPATIENT)
Age: 59
End: 2024-09-11

## 2024-09-11 ENCOUNTER — OUTPATIENT (OUTPATIENT)
Dept: OUTPATIENT SERVICES | Facility: HOSPITAL | Age: 59
LOS: 1 days | End: 2024-09-11
Payer: MEDICARE

## 2024-09-11 VITALS
BODY MASS INDEX: 35.07 KG/M2 | DIASTOLIC BLOOD PRESSURE: 69 MMHG | OXYGEN SATURATION: 92 % | HEART RATE: 97 BPM | SYSTOLIC BLOOD PRESSURE: 129 MMHG | HEIGHT: 70 IN | WEIGHT: 245 LBS

## 2024-09-11 DIAGNOSIS — R06.02 SHORTNESS OF BREATH: ICD-10-CM

## 2024-09-11 DIAGNOSIS — Z98.890 OTHER SPECIFIED POSTPROCEDURAL STATES: Chronic | ICD-10-CM

## 2024-09-11 DIAGNOSIS — J43.9 EMPHYSEMA, UNSPECIFIED: ICD-10-CM

## 2024-09-11 DIAGNOSIS — E78.00 PURE HYPERCHOLESTEROLEMIA, UNSPECIFIED: ICD-10-CM

## 2024-09-11 PROCEDURE — 99204 OFFICE O/P NEW MOD 45 MIN: CPT

## 2024-09-11 PROCEDURE — G2211 COMPLEX E/M VISIT ADD ON: CPT

## 2024-09-11 PROCEDURE — 36415 COLL VENOUS BLD VENIPUNCTURE: CPT

## 2024-09-11 PROCEDURE — C8929: CPT

## 2024-09-11 PROCEDURE — 93306 TTE W/DOPPLER COMPLETE: CPT | Mod: 26

## 2024-09-12 NOTE — REASON FOR VISIT
[Symptom and Test Evaluation] : symptom and test evaluation [FreeTextEntry1] : 58M comes in for a visit. He will be having surgery in the coming weeks. No chest pain noted. However, he does admit to shortness of breath. This is often noted with activity. Symptoms always improve at rest. No recent ischemic work up. We are discussing pre-operative risk stratification.

## 2024-09-12 NOTE — DISCUSSION/SUMMARY
[FreeTextEntry1] : SOB check echo and a pharm nuclear stress test if able to approve and schedule. LI PORTILLO and I discussed his lipid panel and individualized target LDL goal. At this point, will do diet and exercise with anticipation of re-evaluating labs in 3-6 months

## 2024-09-25 ENCOUNTER — APPOINTMENT (OUTPATIENT)
Dept: PULMONOLOGY | Facility: CLINIC | Age: 59
End: 2024-09-25
Payer: MEDICARE

## 2024-09-25 PROCEDURE — 99442: CPT

## 2024-09-26 ENCOUNTER — RESULT REVIEW (OUTPATIENT)
Age: 59
End: 2024-09-26

## 2024-09-26 ENCOUNTER — NON-APPOINTMENT (OUTPATIENT)
Age: 59
End: 2024-09-26

## 2024-09-26 ENCOUNTER — OUTPATIENT (OUTPATIENT)
Dept: OUTPATIENT SERVICES | Facility: HOSPITAL | Age: 59
LOS: 1 days | End: 2024-09-26
Payer: MEDICARE

## 2024-09-26 DIAGNOSIS — E78.00 PURE HYPERCHOLESTEROLEMIA, UNSPECIFIED: ICD-10-CM

## 2024-09-26 DIAGNOSIS — Z98.890 OTHER SPECIFIED POSTPROCEDURAL STATES: Chronic | ICD-10-CM

## 2024-09-26 PROCEDURE — 78452 HT MUSCLE IMAGE SPECT MULT: CPT | Mod: 26

## 2024-09-26 PROCEDURE — 93017 CV STRESS TEST TRACING ONLY: CPT

## 2024-09-26 PROCEDURE — 93018 CV STRESS TEST I&R ONLY: CPT

## 2024-09-26 PROCEDURE — 78452 HT MUSCLE IMAGE SPECT MULT: CPT

## 2024-09-26 PROCEDURE — A9500: CPT

## 2024-09-26 PROCEDURE — 93016 CV STRESS TEST SUPVJ ONLY: CPT

## 2024-09-26 NOTE — END OF VISIT
[] : Fellow [Time Spent: ___ minutes] : I have spent [unfilled] minutes of time on the encounter which excludes teaching and separately reported services. Cimzia Counseling:  I discussed with the patient the risks of Cimzia including but not limited to immunosuppression, allergic reactions and infections.  The patient understands that monitoring is required including a PPD at baseline and must alert us or the primary physician if symptoms of infection or other concerning signs are noted.

## 2024-09-26 NOTE — HISTORY OF PRESENT ILLNESS
[Current] : current [Never] : never [TextBox_4] : I spoke to Mr. Jb Fuentes today in follow-up for his COPD, NSCLC s/p RLLobectomy (2017), and tobacco use; he was located at home at the time of our visit. In review, Mr. Fuentes is a 57 yo M COPD with emphysema, NSCLC s/p RLLobectomy in 2017), Pre-DM, mild ROCIO, and tobacco use. Mr. Fuentes was previously cared for by Dr. Arroyo with last visit in 9/2021.  More recently, Mr. Fuentes had a follow-up CT and visit with Dr. Ellington that identified occlusion of the RML bronchus. He is here today for follow-up pulmonary evaluation.  8/2024: Mr. Fuentes reports feeling tired. He has difficulty traveling for his visits here from [place]. Mr. Fuentes reports having a local pulmonologist in [place], though he does not remember their name. On his most recent visit, supplemental oxygen was recommended, though he did not obtain it due to the cost. He is not longer using Trelegy; he was switched to Breztri. He is tolerating it well, though he developed a rash in an intertriginous area. ROCIO therapy was previously discussed, though he declined.  Over the last 3 months, Mr. Fuentes has had worsened cough with sputum production that makes sleep difficult. The cough occurs during the day and night. His sputum is generally yellow, though he has had 1-2 episodes of blood-streaked sputum. He denies weight change, fever, or chills. He is using the Albuterol infrequently. He has a nebulizer at home, though he also uses it infrequently. He endorses exertional dyspnea. Mr. Fuentes is smoking cigarettes at 1 pack/day.  9/2024: Mr. Fuentes has been doing "about the same" since last visit. His breathing improved with Prednisone, Azithromycin, and Breztri. He continues to use the Breztri. He has not yet started the supplemental oxygen since he has not had time. He is producing daily mucus with his ACT. His SpO2 is in the low 90s when at rest. He is scheduled for a cardiac stress test on 9/26/24.  PMH: COPD Emphysema NSCLC s/p RLLobectomy (2017) Mild ROCIO  Pre-DM Obesity Chronic low back pain HLD  PSH: RLLobectomy  SH: Mr. Fuentes previously worked at the [employer] in [facility]. He currently lives in [place].

## 2024-09-26 NOTE — ASSESSMENT
[FreeTextEntry1] : Labs:  WBC 13.4 (P 71%), Hgb 17.6, Plts 323 Na 137, K 4.7, Cl 100, HCO3 21, BUN/creat 12/0.73, glucose 111, Ca 9.3 Tbili 0.3, AST/ALT 22/31, Alk phos 91, TP/Albumin 7.3/4.2 PT 10.0, INR 0.87, aPTT 33.1  Surgical Final Report (2017) Final Diagnosis 1. Lymph node, level 11, excision: - One lymph node negative for tumor (0/1).  2. Lymph node, level 11, excision: - One lymph node negative for tumor (0/1).  3. Lung, right lower lobe, lobectomy: - Adenocarcinoma with neuroendocrine features, solid pattern predominant with acinar and focal micropapillary growth.  See note.  See synoptic summary. - 10 lymph nodes negative for tumor (0/10). - Respiratory bronchiolitis.  4. Lymph node, level 10, excision: - One lymph node negative for tumor (0/1).  5. Lymph node, level 10, excision: - One lymph node negative for tumor (0/1).  6. Lymph node, level 11, excision: - One lymph node negative for tumor (0/1).  7. Lymph node, subcarinal, excision: - One node negative for tumor (0/1).  8. Superior mediastinal lymph node dissection: - Six lymph nodes negative for tumor (0/6).  Note: Sections show a poorly differentiated adenocarcinoma with predominately solid growth with foci of acinar and focal micropapillary growth.  Spread of tumor cells through alveolar spaces is noted.  Immunohistochemistry shows the tumor cells are positive for TTF-1, Napsin, synaptophysin and are negative for p40 and chromogranin.  Pathologic Staging (pTNM):  pT1b, pN0 [Stage IA] Additional Pathologic Findings: Respiratory bronchiolitis  Chest CT (8/2024): IMPRESSION:  1.	Since 7/17/2023, there is now occlusion of the bronchus intermedius and partial occlusion of the right middle lobe bronchus, resulting in atelectasis of a moderate sized portion of the right middle lobe. While there could be mucus impaction, the possibility of recurrent malignancy should be excluded. Bronchoscopy should be considered. 2. No change in a few solid micronodules in both lungs.  PFTs             2021 FEV1/FVC    53% FEV1            1.91, 53% FVC              3.62, 79% TLC              6.14, 89% RV                2.77, 121% DLCO           10.65, 39% -2021: Positive bronchodilator response  In clinic walk test: 8/30/24): Mr. Gilbert walked ~200 feet and SpO2 fell to 86%  Sleep study (2021): AHI AASM: 10.9 AHI CMS: 8.5 spO2 tina 80% Time <88%: 47 minutes  TTE (9/2024): CONCLUSIONS:  1. Technically difficult study.  2. Normal left ventricular size and systolic function.  3. Normal right ventricular size and systolic function.  4. Normal atria.  5. No significant valvular disease.  6. No pericardial effusion.  7. No prior echo is available for comparison.  A/P: 59 yo M h/o COPD with emphysema, tobacco use, NSCLC and chronic hypoxemic respiratory failure returns in follow-up.  Mr. Fuentes feels "the same" today. He has been producing more mucus with his ACT and having less dyspnea with his LABA/LAMA/ICS. He did not call the DME to arrange delivery of his supplemental oxygen due to being very busy. We discussed the importance of obtaining and utilizing supplemental oxygen.  For his prior mild ROCIO, we discussed the benefits of CPAP use. He declines additional evaluation or treatment of his CPAP now.  1. COPD with emphysema: moderate per GOLD; combined assessment category E 2. Chronic respiratory failure with hypoxemia 3. RML atelectasis 4. Tobacco use 5. h/o NSCLC 6. h/o ROCIO  -awaiting cardiac stress test prior to scheduling his bronchoscopy -continue LABA/LAMA/ICS and PRN Albuterol -ACT with nebulized therapy and vibratory PEP -recommended obtaining and utilizing supplemental oxygen: -order supplemental oxygen 2L with exertion and sleep; finger SpO2 monitor provided -tobacco use and ROCIO not discussed today since Mr. Fuentes had to leave the visit early -anticipate bronchoscopy scheduling after stress test -Vaccinations: not discussed today -Future considerations: Pulmonary rehabilitation -follow-up with me in 3-4 weeks to ensure above is obtained

## 2024-10-04 ENCOUNTER — OUTPATIENT (OUTPATIENT)
Dept: OUTPATIENT SERVICES | Facility: HOSPITAL | Age: 59
LOS: 1 days | Discharge: HOME | End: 2024-10-04
Payer: MEDICARE

## 2024-10-04 ENCOUNTER — RESULT REVIEW (OUTPATIENT)
Age: 59
End: 2024-10-04

## 2024-10-04 ENCOUNTER — APPOINTMENT (OUTPATIENT)
Dept: PULMONOLOGY | Facility: HOSPITAL | Age: 59
End: 2024-10-04

## 2024-10-04 VITALS
RESPIRATION RATE: 18 BRPM | SYSTOLIC BLOOD PRESSURE: 128 MMHG | WEIGHT: 244.93 LBS | TEMPERATURE: 98 F | HEART RATE: 90 BPM | OXYGEN SATURATION: 92 % | DIASTOLIC BLOOD PRESSURE: 82 MMHG | HEIGHT: 70 IN

## 2024-10-04 DIAGNOSIS — Z98.890 OTHER SPECIFIED POSTPROCEDURAL STATES: Chronic | ICD-10-CM

## 2024-10-04 LAB
B PERT IGG+IGM PNL SER: SIGNIFICANT CHANGE UP
COLOR FLD: SIGNIFICANT CHANGE UP
COMMENT - FLUIDS: SIGNIFICANT CHANGE UP
FLUID INTAKE SUBSTANCE CLASS: SIGNIFICANT CHANGE UP
GRAM STN FLD: SIGNIFICANT CHANGE UP
LYMPHOCYTES # FLD: 2 % — SIGNIFICANT CHANGE UP
MONOS+MACROS # FLD: 1 % — SIGNIFICANT CHANGE UP
NEUTROPHILS-BODY FLUID: 32 % — SIGNIFICANT CHANGE UP
NIGHT BLUE STAIN TISS: SIGNIFICANT CHANGE UP
RCV VOL RI: 538 /UL — HIGH (ref 0–0)
SPECIMEN SOURCE FLD: SIGNIFICANT CHANGE UP
SPECIMEN SOURCE: SIGNIFICANT CHANGE UP
SPECIMEN SOURCE: SIGNIFICANT CHANGE UP
TOTAL NUCLEATED CELL COUNT, BODY FLUID: 35 /UL — SIGNIFICANT CHANGE UP
TUBE TYPE: SIGNIFICANT CHANGE UP

## 2024-10-04 PROCEDURE — 31624 DX BRONCHOSCOPE/LAVAGE: CPT | Mod: GC

## 2024-10-04 PROCEDURE — 88112 CYTOPATH CELL ENHANCE TECH: CPT

## 2024-10-04 PROCEDURE — 87015 SPECIMEN INFECT AGNT CONCNTJ: CPT

## 2024-10-04 PROCEDURE — 31645 BRNCHSC W/THER ASPIR 1ST: CPT | Mod: GC

## 2024-10-04 PROCEDURE — 87449 NOS EACH ORGANISM AG IA: CPT

## 2024-10-04 PROCEDURE — 87070 CULTURE OTHR SPECIMN AEROBIC: CPT

## 2024-10-04 PROCEDURE — 87305 ASPERGILLUS AG IA: CPT

## 2024-10-04 PROCEDURE — 87205 SMEAR GRAM STAIN: CPT

## 2024-10-04 PROCEDURE — C9399: CPT

## 2024-10-04 PROCEDURE — 87206 SMEAR FLUORESCENT/ACID STAI: CPT

## 2024-10-04 PROCEDURE — 31645 BRNCHSC W/THER ASPIR 1ST: CPT

## 2024-10-04 PROCEDURE — 88112 CYTOPATH CELL ENHANCE TECH: CPT | Mod: 26

## 2024-10-04 PROCEDURE — 89051 BODY FLUID CELL COUNT: CPT

## 2024-10-04 PROCEDURE — 88305 TISSUE EXAM BY PATHOLOGIST: CPT

## 2024-10-04 PROCEDURE — 88305 TISSUE EXAM BY PATHOLOGIST: CPT | Mod: 26

## 2024-10-04 PROCEDURE — 87102 FUNGUS ISOLATION CULTURE: CPT

## 2024-10-04 PROCEDURE — 87116 MYCOBACTERIA CULTURE: CPT

## 2024-10-04 RX ORDER — ALBUTEROL SULFATE 90 UG/1
108 (90 BASE) INHALANT RESPIRATORY (INHALATION)
Qty: 1 | Refills: 0 | Status: ACTIVE | COMMUNITY
Start: 2024-10-04 | End: 1900-01-01

## 2024-10-04 NOTE — PRE-ANESTHESIA EVALUATION ADULT - NSANTHPMHFT_GEN_ALL_CORE
Cardiac: Positive for HLD. Denies HTN, MI/Angina/Heart Failure, Arrhythmia, Murmur/Valvular Disorder. >4 METS  Pulmonary: Positive for emphysema, COPD, sleep apnea non-compliant on CPAP, NSCLC s/p right lower lobe lobectomy in 2017.   Renal: Denies kidney dysfunction  Hepatic: Denies liver dysfunction  Gastrointestinal: Denies GERD/IBS  Endocrine: Positive for prediabetes, reports no medication. Denies thyroid dysfunction.  Neurologic: Positive for lumbar spine and cervical spine surgery. Denies stroke/seizure disorder.  Hematologic: Denies anemia, blood clotting disorder, blood thinning medication.     PSH: NSCLC s/p right lower lobe lobectomy, lumbar spine surgery, cervical spine surgery, foot surgery.

## 2024-10-04 NOTE — PRE-ANESTHESIA EVALUATION ADULT - NSDENTALSD_ENT_ALL_CORE
(4) no impairment Missing several rear molars, premolars, teeth. Poor dentition with several chipped teeth, ground/worn teeth, cavities./missing teeth

## 2024-10-04 NOTE — PRE-ANESTHESIA EVALUATION ADULT - NSRADCARDRESULTSFT_GEN_ALL_CORE
All available imaging reviewed. All available imaging reviewed.    TTE 9/11/2024  "CONCLUSIONS:     1. Technically difficult study.   2. Normal left ventricular size and systolic function.   3. Normal right ventricular size and systolic function.   4. Normal atria.   5. No significant valvular disease.   6. No pericardial effusion.   7. No prior echo is available for comparison."    Pharmacologic MIBI Lexiscan Stress Test 9/26/2024  "Impression:    Myocardial perfusion imaging is normal. Overall left ventricular systolic   function is normal without regional wall motion abnormalities. The EKG   stress test is normal. Additionally, the right ventricle appears enlarged   with decreased function. Abnormal tracer uptake of unknown significance   noted in the left lower lobe, clinical correlation suggested."

## 2024-10-06 LAB
CULTURE RESULTS: SIGNIFICANT CHANGE UP
SPECIMEN SOURCE: SIGNIFICANT CHANGE UP

## 2024-10-07 ENCOUNTER — NON-APPOINTMENT (OUTPATIENT)
Age: 59
End: 2024-10-07

## 2024-10-07 LAB — FUNGITELL B-D-GLUCAN,  BRONCHIAL LAVAGE: SIGNIFICANT CHANGE UP

## 2024-10-08 ENCOUNTER — NON-APPOINTMENT (OUTPATIENT)
Age: 59
End: 2024-10-08

## 2024-10-08 LAB
GALACTOMANNAN AG SERPL-ACNC: 0.04 INDEX — SIGNIFICANT CHANGE UP (ref 0–0.49)
NON-GYNECOLOGICAL CYTOLOGY STUDY: SIGNIFICANT CHANGE UP

## 2024-10-09 ENCOUNTER — APPOINTMENT (OUTPATIENT)
Dept: HEART AND VASCULAR | Facility: CLINIC | Age: 59
End: 2024-10-09

## 2024-10-16 ENCOUNTER — APPOINTMENT (OUTPATIENT)
Dept: PULMONOLOGY | Facility: CLINIC | Age: 59
End: 2024-10-16

## 2024-10-16 PROCEDURE — 99442: CPT

## 2024-10-16 RX ORDER — BUPROPION HYDROCHLORIDE 150 MG/1
150 TABLET, FILM COATED, EXTENDED RELEASE ORAL
Qty: 60 | Refills: 2 | Status: ACTIVE | COMMUNITY
Start: 2024-10-16 | End: 1900-01-01

## 2024-10-16 NOTE — HISTORY OF PRESENT ILLNESS
[Current] : current [Never] : never [TextBox_4] : I spoke to Mr. Surjit Garduno today in follow-up for his COPD, NSCLC s/p RLLobectomy (2017), and tobacco use; he was located at home at the time of our visit. In review, Mr. Garduno is a 57 yo M COPD with emphysema, NSCLC s/p RLLobectomy in 2017), Pre-DM, mild DEDRA, and tobacco use. Mr. Garduno was previously cared for by Dr. Sanchez with last visit in 9/2021.   More recently, Mr. Garduno had a follow-up CT and visit with Dr. Maynard that identified occlusion of the RML bronchus. He is here today for follow-up pulmonary evaluation.  Mr. Garduno's bronchoscopy was performed on 10/4/24. The airway inspection shows erythematous mucosa with RML collapse. There was no endobronchial lesion. On EBUS, there was no adenopathy causing extrinsic compression. Tracheobronchomalacia was suspected etiology.  8/2024: Mr. Garduno reports feeling tired. He has difficulty traveling for his visits here from [place]. Mr. Garduno reports having a local pulmonologist in [place], though he does not remember their name. On his most recent visit, supplemental oxygen was recommended, though he did not obtain it due to the cost. He is not longer using Trelegy; he was switched to Breztri. He is tolerating it well, though he developed a rash in an intertriginous area. DEDRA therapy was previously discussed, though he declined.  Over the last 3 months, Mr. Garduno has had worsened cough with sputum production that makes sleep difficult. The cough occurs during the day and night. His sputum is generally yellow, though he has had 1-2 episodes of blood-streaked sputum. He denies weight change, fever, or chills. He is using the Albuterol infrequently. He has a nebulizer at home, though he also uses it infrequently. He endorses exertional dyspnea. Mr. Garduno is smoking cigarettes at 1 pack/day.  9/2024: Mr. Garduno has been doing "about the same" since last visit. His breathing improved with Prednisone, Azithromycin, and Breztri. He continues to use the Breztri. He has not yet started the supplemental oxygen since he has not had time. He is producing daily mucus with his ACT. His SpO2 is in the low 90s when at rest. He is scheduled for a cardiac stress test on 9/26/24.  10/2024: Since his bronchoscopy, Mr. FITZGERALD has been doing "ok." He has not had respiratory exacerbations. He has had continued cough with sputum production (no hemoptysis). He feels well when walking at a slow pace; he has dyspnea when rushing or walking fast. He is taking Breztri BID and Albuterol 1-2X/day. He has not called the DME to arrange home oxygen. He continues to smoke 1 pack/day. He is interested in quitting. In the past, he used Bupropion with benefit. He never obtained a CPAP. He would consider trying CPAP for probable tracheobronchomalacia.  PMH: COPD Emphysema NSCLC s/p RLLobectomy (2017) Mild DEDRA  Pre-DM Obesity Chronic low back pain HLD  PSH: RLLobectomy  SH: Mr. Garduno previously worked at the [employer] in [employer]. He currently lives in [place].

## 2024-10-16 NOTE — ADDENDUM
[FreeTextEntry1] : Addendum ([facility]; 10/16/24): Atter Mr. Garduno's bronchoscopy on 10/4/24, he had persistent exertional hypoxemia with SpO2 falling to 80-85% after his procedure. At home, he reports values of 85% after exertion (on room air).

## 2024-10-16 NOTE — ASSESSMENT
[FreeTextEntry1] : Labs:  WBC 13.4 (P 71%), Hgb 17.6, Plts 323 Na 137, K 4.7, Cl 100, HCO3 21, BUN/creat 12/0.73, glucose 111, Ca 9.3 Tbili 0.3, AST/ALT 22/31, Alk phos 91, TP/Albumin 7.3/4.2 PT 10.0, INR 0.87, aPTT 33.1  Surgical Final Report (2017) Final Diagnosis 1. Lymph node, level 11, excision: - One lymph node negative for tumor (0/1).  2. Lymph node, level 11, excision: - One lymph node negative for tumor (0/1).  3. Lung, right lower lobe, lobectomy: - Adenocarcinoma with neuroendocrine features, solid pattern predominant with acinar and focal micropapillary growth.  See note.  See synoptic summary. - 10 lymph nodes negative for tumor (0/10). - Respiratory bronchiolitis.  4. Lymph node, level 10, excision: - One lymph node negative for tumor (0/1).  5. Lymph node, level 10, excision: - One lymph node negative for tumor (0/1).  6. Lymph node, level 11, excision: - One lymph node negative for tumor (0/1).  7. Lymph node, subcarinal, excision: - One node negative for tumor (0/1).  8. Superior mediastinal lymph node dissection: - Six lymph nodes negative for tumor (0/6).  Note: Sections show a poorly differentiated adenocarcinoma with predominately solid growth with foci of acinar and focal micropapillary growth.  Spread of tumor cells through alveolar spaces is noted.  Immunohistochemistry shows the tumor cells are positive for TTF-1, Napsin, synaptophysin and are negative for p40 and chromogranin.  Pathologic Staging (pTNM):  pT1b, pN0 [Stage IA] Additional Pathologic Findings: Respiratory bronchiolitis  Bronchoscopy: 10/4/24 BAL WBC 35 (P32, L2, M1)  Bacterial culture: normal respiratory negrita Fungal culture: NGTD AFB: NGTD  Beta-D-glucan: 228 Aspergillus antigen: negative  Cytology: Final Diagnosis LUNG, RIGHT MIDDLE LOBE, BAL: NEGATIVE FOR MALIGNANT CELLS Examination reveals ciliated bronchial epithelial cells and scattered alveolar macrophages. The cellblock is scant and shows similar findings.  Chest CT (8/2024): IMPRESSION:  1.	Since 7/17/2023, there is now occlusion of the bronchus intermedius and partial occlusion of the right middle lobe bronchus, resulting in atelectasis of a moderate sized portion of the right middle lobe. While there could be mucus impaction, the possibility of recurrent malignancy should be excluded. Bronchoscopy should be considered. 2. No change in a few solid micronodules in both lungs.  PFTs             2021 FEV1/FVC    53% FEV1            1.91, 53% FVC              3.62, 79% TLC              6.14, 89% RV                2.77, 121% DLCO           10.65, 39% -2021: Positive bronchodilator response  In clinic walk test: 8/30/24) Mr. Cheney walked ~200 feet and SpO2 fell to 86%  Sleep study (2021): AHI AASM: 10.9 AHI CMS: 8.5 spO2 singh 80% Time <88%: 47 minutes  TTE (9/2024): CONCLUSIONS:  1. Technically difficult study.  2. Normal left ventricular size and systolic function.  3. Normal right ventricular size and systolic function.  4. Normal atria.  5. No significant valvular disease.  6. No pericardial effusion.  7. No prior echo is available for comparison.  Stress test (9/2024): Myocardial perfusion imaging is normal. Overall, the left ventricular systolic function is normal without regional WMA. The EKG stress test is normal. Additionally, the right ventricle appears enlarged with decreased function. Abnormal tracer uptake of unknown significance is noted in the left lower lobe, clinical correlation suggested.  A/P: 57 yo M h/o COPD c/b chronic respiratory failure, tobacco use, NSCLC s/p RLLobectomy, obesitiy c/b DEDRA, and recent RML collapse returns to clinic.  On bronchoscopy, Mr. Garduno does not have an endobronchial lesion nor compressive lymph node. His airway is edematous , erythematous, and collapsible, likely related to tobacco use and tracheobronchomalacia. I recommended tobacco cessation, oxygen use, and consideration of CPAP trial.  He remains on his LABA/LAMA/ICS and PRN Albuterol. He is in the action phase of tobacco cessation and would like to re-try Bupropion. He requested I call the DME to re-request supplemental oxygen.  For his suspected TBM, we discussed airway clearance and CPAP trial. He previously struggled with the in-lab PSG and declines repeating it. He is willing to try a HSAT.   His cardiac stress test suggests RV enlargement, which was not seen on TTE. If present, WHO Group III pulmonary hypertension is most likely. We should institute supplemental oxygen, evaluate his night-time oxygenation, and trend his TTE.  1. COPD with emphysema: moderate per GOLD; combined assessment category E 2. Chronic respiratory failure with hypoxemia 3. Tobacco use 4. h/o NSCLC s/p RLLobectomy 5. RML collapse (? TBM) 6. Obesity  7. DEDRA 8. ? RV enlargement  -continue LABA/LAMA/ICS; PRN Albuterol -ACT with nebulized therapy and vibratory PEP -recommended total tobacco cessation; trial Bupropion -re-request home oxygen from DME; I encouraged him to call them to arrange; 2L of oxygen with exertion and sleep; monitor SpO2 at home -chest CT in 6 months (2/2025) -Home sleep study -Vaccinations: not discussed today -Future considerations: Pulmonary rehabilitation, repeat TTE -follow-up with me in 3 months

## 2024-10-16 NOTE — PHYSICAL EXAM
[No Acute Distress] : no acute distress [TextBox_2] : Unable to obtain; breathing comfortably over the phone with periodic cough.

## 2024-11-19 ENCOUNTER — APPOINTMENT (OUTPATIENT)
Dept: SLEEP CENTER | Facility: HOME HEALTH | Age: 59
End: 2024-11-19

## 2025-01-21 ENCOUNTER — OUTPATIENT (OUTPATIENT)
Dept: OUTPATIENT SERVICES | Facility: HOSPITAL | Age: 60
LOS: 1 days | End: 2025-01-21
Payer: MEDICARE

## 2025-01-21 ENCOUNTER — APPOINTMENT (OUTPATIENT)
Dept: CT IMAGING | Facility: HOSPITAL | Age: 60
End: 2025-01-21

## 2025-01-21 DIAGNOSIS — Z98.890 OTHER SPECIFIED POSTPROCEDURAL STATES: Chronic | ICD-10-CM

## 2025-01-21 PROCEDURE — 71250 CT THORAX DX C-: CPT | Mod: 26

## 2025-01-21 PROCEDURE — 71250 CT THORAX DX C-: CPT

## 2025-01-31 ENCOUNTER — APPOINTMENT (OUTPATIENT)
Dept: PULMONOLOGY | Facility: CLINIC | Age: 60
End: 2025-01-31

## 2025-01-31 PROCEDURE — 99213 OFFICE O/P EST LOW 20 MIN: CPT | Mod: 93

## 2025-01-31 PROCEDURE — 99406 BEHAV CHNG SMOKING 3-10 MIN: CPT | Mod: 93

## 2025-01-31 RX ORDER — FLUTICASONE FUROATE, UMECLIDINIUM BROMIDE AND VILANTEROL TRIFENATATE 100; 62.5; 25 UG/1; UG/1; UG/1
100-62.5-25 POWDER RESPIRATORY (INHALATION) DAILY
Qty: 1 | Refills: 11 | Status: DISCONTINUED | COMMUNITY
Start: 2025-01-31 | End: 2025-01-31

## 2025-01-31 RX ORDER — BUDESONIDE, GLYCOPYRROLATE, AND FORMOTEROL FUMARATE 160; 9; 4.8 UG/1; UG/1; UG/1
160-9-4.8 AEROSOL, METERED RESPIRATORY (INHALATION)
Qty: 10.7 | Refills: 6 | Status: ACTIVE | COMMUNITY
Start: 2025-01-31 | End: 1900-01-01

## 2025-01-31 RX ORDER — ALBUTEROL SULFATE 2.5 MG/3ML
(2.5 MG/3ML) SOLUTION RESPIRATORY (INHALATION)
Qty: 1 | Refills: 11 | Status: ACTIVE | COMMUNITY
Start: 2025-01-31 | End: 1900-01-01

## 2025-01-31 NOTE — ASSESSMENT
[FreeTextEntry1] : Labs:  8/2024 WBC 13.4 (P 71%), Hgb 17.6, Plts 323 Na 137, K 4.7, Cl 100, HCO3 21, BUN/creat 12/0.73, glucose 111, Ca 9.3 Tbili 0.3, AST/ALT 22/31, Alk phos 91, TP/Albumin 7.3/4.2 PT 10.0, INR 0.87, aPTT 33.1  Surgical Final Report (2017) Final Diagnosis 1. Lymph node, level 11, excision: - One lymph node negative for tumor (0/1). 2. Lymph node, level 11, excision: - One lymph node negative for tumor (0/1). 3. Lung, right lower lobe, lobectomy: - Adenocarcinoma with neuroendocrine features, solid pattern predominant with acinar and focal micropapillary growth.  See note.  See synoptic summary. - 10 lymph nodes negative for tumor (0/10). - Respiratory bronchiolitis. 4. Lymph node, level 10, excision: - One lymph node negative for tumor (0/1). 5. Lymph node, level 10, excision: - One lymph node negative for tumor (0/1). 6. Lymph node, level 11, excision: - One lymph node negative for tumor (0/1). 7. Lymph node, subcarinal, excision: - One node negative for tumor (0/1). 8. Superior mediastinal lymph node dissection: - Six lymph nodes negative for tumor (0/6). Note: Sections show a poorly differentiated adenocarcinoma with predominately solid growth with foci of acinar and focal micropapillary growth.  Spread of tumor cells through alveolar spaces is noted.  Immunohistochemistry shows the tumor cells are positive for TTF-1, Napsin, synaptophysin and are negative for p40 and chromogranin.  Pathologic Staging (pTNM):  pT1b, pN0 [Stage IA] Additional Pathologic Findings: Respiratory bronchiolitis  Bronchoscopy: 10/4/24 BAL WBC 35 (P32, L2, M1)  Bacterial culture: normal respiratory negrita Fungal culture: NGTD AFB: NGTD  Beta-D-glucan: 228 Aspergillus antigen: negative  Cytology: Final Diagnosis LUNG, RIGHT MIDDLE LOBE, BAL: NEGATIVE FOR MALIGNANT CELLS Examination reveals ciliated bronchial epithelial cells and scattered alveolar macrophages. The cellblock is scant and shows similar findings.  Chest CT (8/2024): IMPRESSION:  1.	Since 7/17/2023, there is now occlusion of the bronchus intermedius and partial occlusion of the right middle lobe bronchus, resulting in atelectasis of a moderate sized portion of the right middle lobe. While there could be mucus impaction, the possibility of recurrent malignancy should be excluded. Bronchoscopy should be considered. 2. No change in a few solid micronodules in both lungs.  Chest CT (1/2025): IMPRESSION: No evidence of recurrent or metastatic disease. Resolved right middle lobe endobronchial opacities and improved atelectasis.   PFTs             2021 FEV1/FVC    53% FEV1            1.91, 53% FVC              3.62, 79% TLC              6.14, 89% RV                2.77, 121% DLCO           10.65, 39% -2021: Positive bronchodilator response  In clinic walk test: 8/30/24) Mr. Cheney walked ~200 feet and SpO2 fell to 86%  Sleep study (2021): AHI AASM: 10.9 AHI CMS: 8.5 spO2 singh 80% Time <88%: 47 minutes  TTE (9/2024): CONCLUSIONS:  1. Technically difficult study.  2. Normal left ventricular size and systolic function.  3. Normal right ventricular size and systolic function.  4. Normal atria.  5. No significant valvular disease.  6. No pericardial effusion.  7. No prior echo is available for comparison.  Stress test (9/2024): Myocardial perfusion imaging is normal. Overall, the left ventricular systolic function is normal without regional WMA. The EKG stress test is normal. Additionally, the right ventricle appears enlarged with decreased function. Abnormal tracer uptake of unknown significance is noted in the left lower lobe, clinical correlation suggested.  A/P: 58 yo M h/o COPD c/b chronic respiratory failure, tobacco use, NSCLC s/p RLLobectomy, obesity c/b DEDRA, and incidental RML collapse (8/2024) returns to clinic.  Clinically, Mr. Garduno is doing well today. His tobacco smoking is reduced, leading to improved cough. I suspect his CT scan improvement is related to reduced airway edema and improved RML ventilation. He still has exertional dyspnea and chronic cough.  Overall, we discussed opportunities to benefit his health, including supplemental oxygen, total tobacco cessation, LABA/LAMA/ICS, physical activity, and weight loss. He declines supplemental oxygen. We discussed improved survival with normal oxygenation; he will consider and call if he changes his mind. He would like to continue reducing his cigarette smoking; he requests continuation of his Bupropion. We will continue LABA/LAMA/ICS and Albuterol.  For his lung imaging, there is improvement in RML ventilation and no evidence of malignancy. His bronchoscopy suggested a component of TBM. I think his chest CT will continue to improve with tobacco smoking cessation. We previously discussed airway clearance and CPAP trial. He previously struggled with the in-lab PSG and declines repeating it. He was willing to try an HSAT, though he did not obtain it. For his cancer surveillance, we will repeat his chest CT in 6 months.  1. COPD with emphysema: moderate per GOLD; combined assessment category E 2. Chronic respiratory failure with hypoxemia 3. Tobacco use 4. h/o NSCLC s/p RLLobectomy 5. RML collapse, related to airway edema and TBM 6. Obesity  7. DEDRA  -continue LABA/LAMA/ICS (Breztri); PRN Albuterol -congratulated on total reduction; recommended total tobacco cessation; re-order Bupropion -ACT with nebulized therapy and vibratory PEP -recommended oxygen use to reduce his risk of death; he declined but will consider upon his re-evaluation; he will call me in the interim if he has symptomatic worsening.  -HSAT and CPAP were previously recommended, though he did not obtain -chest CT in 6 months (7/2025) -Vaccinations: not discussed today -Future considerations: Pulmonary rehabilitation, repeat TTE, A1AT -follow-up with me in 7/2025

## 2025-01-31 NOTE — HISTORY OF PRESENT ILLNESS
[Current] : current [Never] : never [TextBox_4] : I spoke to Mr. Surjit Garduno today in follow-up for his COPD, NSCLC s/p RLLobectomy (2017), and tobacco use; he was located at home at the time of our visit and was unable to arrange a video visit. In review, Mr. Garduno is a 60 yo M COPD with emphysema, NSCLC s/p RLLobectomy in 2017), Pre-DM, mild DEDRA, and tobacco use. Mr. Garduno was previously cared for by Dr. Sanchez with last visit in 9/2021.   More recently, Mr. Garduno had a follow-up CT and visit with Dr. Maynard that identified occlusion of the RML bronchus. Mr. Garduno's bronchoscopy was performed on 10/4/24. The airway inspection shows erythematous mucosa with RML collapse. There was no endobronchial lesion. On EBUS, there was no adenopathy causing extrinsic compression. Tracheobronchomalacia was suspected etiology.  8/2024: Mr. Garduno reports feeling tired. He has difficulty traveling for his visits here from [place]. Mr. Garduno reports having a local pulmonologist in [place], though he does not remember their name. On his most recent visit, supplemental oxygen was recommended, though he did not obtain it due to the cost. He is not longer using Trelegy; he was switched to Breztri. He is tolerating it well, though he developed a rash in an intertriginous area. DEDRA therapy was previously discussed, though he declined.  Over the last 3 months, Mr. Garduno has had worsened cough with sputum production that makes sleep difficult. The cough occurs during the day and night. His sputum is generally yellow, though he has had 1-2 episodes of blood-streaked sputum. He denies weight change, fever, or chills. He is using the Albuterol infrequently. He has a nebulizer at home, though he also uses it infrequently. He endorses exertional dyspnea. Mr. Garduno is smoking cigarettes at 1 pack/day.  9/2024: Mr. Gardnuo has been doing "about the same" since last visit. His breathing improved with Prednisone, Azithromycin, and Breztri. He continues to use the Breztri. He has not yet started the supplemental oxygen since he has not had time. He is producing daily mucus with his ACT. His SpO2 is in the low 90s when at rest. He is scheduled for a cardiac stress test on 9/26/24.  10/2024: Since his bronchoscopy, Mr. FITZGERALD has been doing "ok." He has not had respiratory exacerbations. He has had continued cough with sputum production (no hemoptysis). He feels well when walking at a slow pace; he has dyspnea when rushing or walking fast. He is taking Breztri BID and Albuterol 1-2X/day. He has not called the DME to arrange home oxygen. He continues to smoke 1 pack/day. He is interested in quitting. In the past, he used Bupropion with benefit. He never obtained a CPAP. He would consider trying CPAP for probable tracheobronchomalacia.  1/2025: Mr. Garudno has felt better since our last visit. With Bupropion, he reduced his cigarette smoking form 1 ppd to 1/4 ppd. He is about to run out of Bupropion and hopes to continue it. He is taking Breztri and Albuterol regularly, and he feels that they help. He has not had respiratory exacerbations since our last visit. He has been more stressed recently due to caring for a friend who suffered a CVA and MI. His weight is stable. He did not obtain the recommended supplemental oxygen. He has a daily cough productive of sputum; the sputum volume is lower now than in 10/2024.  PMH: COPD Emphysema NSCLC s/p RLLobectomy (2017) Mild DEDRA  Pre-DM Obesity Chronic low back pain HLD  PSH: RLLobectomy  SH: Mr. Garduno previously worked at the [employer] in [place]. He currently lives in [place].

## 2025-01-31 NOTE — HISTORY OF PRESENT ILLNESS
[Current] : current [Never] : never [TextBox_4] : I spoke to Mr. Surjit Garduno today in follow-up for his COPD, NSCLC s/p RLLobectomy (2017), and tobacco use; he was located at home at the time of our visit and was unable to arrange a video visit. In review, Mr. Garduno is a 60 yo M COPD with emphysema, NSCLC s/p RLLobectomy in 2017), Pre-DM, mild DEDRA, and tobacco use. Mr. Garduno was previously cared for by Dr. Sanchez with last visit in 9/2021.   More recently, Mr. Garduno had a follow-up CT and visit with Dr. Maynard that identified occlusion of the RML bronchus. Mr. Garduno's bronchoscopy was performed on 10/4/24. The airway inspection shows erythematous mucosa with RML collapse. There was no endobronchial lesion. On EBUS, there was no adenopathy causing extrinsic compression. Tracheobronchomalacia was suspected etiology.  8/2024: Mr. Garduno reports feeling tired. He has difficulty traveling for his visits here from [place]. Mr. Garduno reports having a local pulmonologist in [place], though he does not remember their name. On his most recent visit, supplemental oxygen was recommended, though he did not obtain it due to the cost. He is not longer using Trelegy; he was switched to Breztri. He is tolerating it well, though he developed a rash in an intertriginous area. DEDRA therapy was previously discussed, though he declined.  Over the last 3 months, Mr. Garduno has had worsened cough with sputum production that makes sleep difficult. The cough occurs during the day and night. His sputum is generally yellow, though he has had 1-2 episodes of blood-streaked sputum. He denies weight change, fever, or chills. He is using the Albuterol infrequently. He has a nebulizer at home, though he also uses it infrequently. He endorses exertional dyspnea. Mr. Garduno is smoking cigarettes at 1 pack/day.  9/2024: Mr. Garduon has been doing "about the same" since last visit. His breathing improved with Prednisone, Azithromycin, and Breztri. He continues to use the Breztri. He has not yet started the supplemental oxygen since he has not had time. He is producing daily mucus with his ACT. His SpO2 is in the low 90s when at rest. He is scheduled for a cardiac stress test on 9/26/24.  10/2024: Since his bronchoscopy, Mr. FITZGERALD has been doing "ok." He has not had respiratory exacerbations. He has had continued cough with sputum production (no hemoptysis). He feels well when walking at a slow pace; he has dyspnea when rushing or walking fast. He is taking Breztri BID and Albuterol 1-2X/day. He has not called the DME to arrange home oxygen. He continues to smoke 1 pack/day. He is interested in quitting. In the past, he used Bupropion with benefit. He never obtained a CPAP. He would consider trying CPAP for probable tracheobronchomalacia.  1/2025: Mr. Garduno has felt better since our last visit. With Bupropion, he reduced his cigarette smoking form 1 ppd to 1/4 ppd. He is about to run out of Bupropion and hopes to continue it. He is taking Breztri and Albuterol regularly, and he feels that they help. He has not had respiratory exacerbations since our last visit. He has been more stressed recently due to caring for a friend who suffered a CVA and MI. His weight is stable. He did not obtain the recommended supplemental oxygen. He has a daily cough productive of sputum; the sputum volume is lower now than in 10/2024.  PMH: COPD Emphysema NSCLC s/p RLLobectomy (2017) Mild DEDRA  Pre-DM Obesity Chronic low back pain HLD  PSH: RLLobectomy  SH: Mr. Garduno previously worked at [employer] in [employer]. He currently lives in [place].

## 2025-01-31 NOTE — ADDENDUM
[FreeTextEntry1] : Addendum ([facility]; 10/16/24): Atter Mr. Garduno's bronchoscopy on 10/4/24, he had persistent exertional hypoxemia with SpO2 falling to 80-85% after his procedure. At home, he reports values of 85% after exertion (on room air).

## 2025-01-31 NOTE — ADDENDUM
[FreeTextEntry1] : Addendum ([facility]; 10/16/24): Atter Mr. Garduno's bronchoscopy on 10/4/24, he had persistent exertional hypoxemia with SpO2 falling to 80-85% after his procedure. At home, he reports values of 85% after exertion (on room air).

## 2025-02-18 ENCOUNTER — NON-APPOINTMENT (OUTPATIENT)
Age: 60
End: 2025-02-18